# Patient Record
Sex: MALE | Race: WHITE | NOT HISPANIC OR LATINO | Employment: FULL TIME | URBAN - METROPOLITAN AREA
[De-identification: names, ages, dates, MRNs, and addresses within clinical notes are randomized per-mention and may not be internally consistent; named-entity substitution may affect disease eponyms.]

---

## 2020-12-30 DIAGNOSIS — F42.2 MIXED OBSESSIONAL THOUGHTS AND ACTS: Primary | ICD-10-CM

## 2020-12-30 RX ORDER — PAROXETINE HYDROCHLORIDE 25 MG/1
25 TABLET, FILM COATED, EXTENDED RELEASE ORAL 2 TIMES DAILY
COMMUNITY
End: 2020-12-30 | Stop reason: SDUPTHER

## 2020-12-30 RX ORDER — PAROXETINE HYDROCHLORIDE 25 MG/1
25 TABLET, FILM COATED, EXTENDED RELEASE ORAL 2 TIMES DAILY
Qty: 60 TABLET | Refills: 0 | Status: SHIPPED | OUTPATIENT
Start: 2020-12-30 | End: 2021-02-09 | Stop reason: SDUPTHER

## 2021-01-04 ENCOUNTER — TELEPHONE (OUTPATIENT)
Dept: BEHAVIORAL/MENTAL HEALTH CLINIC | Facility: CLINIC | Age: 41
End: 2021-01-04

## 2021-01-04 NOTE — TELEPHONE ENCOUNTER
Pharmacy called and wanted to know if this was an increase in dose  Ahmet Jacobs has been getting one tab not two  When pt was asked on the dose he was taking, he replied two tabs

## 2021-01-11 ENCOUNTER — TELEMEDICINE (OUTPATIENT)
Dept: PSYCHIATRY | Facility: CLINIC | Age: 41
End: 2021-01-11
Payer: COMMERCIAL

## 2021-01-11 DIAGNOSIS — Z79.899 ENCOUNTER FOR LONG-TERM (CURRENT) USE OF HIGH-RISK MEDICATION: ICD-10-CM

## 2021-01-11 DIAGNOSIS — F42.9 OBSESSIVE-COMPULSIVE DISORDER, UNSPECIFIED TYPE: Primary | ICD-10-CM

## 2021-01-11 PROCEDURE — 99214 OFFICE O/P EST MOD 30 MIN: CPT | Performed by: NURSE PRACTITIONER

## 2021-01-11 RX ORDER — LURASIDONE HYDROCHLORIDE 20 MG/1
20 TABLET, FILM COATED ORAL
Qty: 30 TABLET | Refills: 0 | Status: SHIPPED | OUTPATIENT
Start: 2021-01-11 | End: 2021-02-10 | Stop reason: DRUGHIGH

## 2021-01-11 NOTE — PSYCH
PROGRESS NOTE        [facility]      Name and Date of Birth:  Patty Schroeder 47 y o  1980    Date of Visit: 01/11/21    Pt was seen for medication management for 30 minutes via Teams with pts consent  Door to office was closed, provider was alone in office  SUBJECTIVE: Pt reports increased obsessive thoughts and compulsive behavior regarding orderliness and contamination  His own clothes must be free of germs although the same does not apply to his wife and children, which pt calls "illogical " When pt comes home from work although he is very tired he has to tidy up his kids' things  At work he is not stressed out about germs, only at home and only on himself and his clothing  He denies suicidal ideation, intent or plan at present, has no suicidal ideation, intent or plan at present  He denies any auditory hallucinations and visual hallucinations, denies any other delusional thinking, denies any delusional thinking  He denies any side effects from medications    HPI ROS Appetite Changes and Sleep: normal appetite, normal sleep    Review Of Systems:      Constitutional Negative   ENT Negative   Cardiovascular Negative   Respiratory Negative   Gastrointestinal Negative   Genitourinary Negative   Musculoskeletal Negative   Integumentary Negative   Neurological Negative   Endocrine Negative   Other Symptoms Negative and None       Laboratory Results: No results found for this or any previous visit  Substance Abuse History:    Social History     Substance and Sexual Activity   Drug Use Not on file       Family Psychiatric History:     No family history on file      The following portions of the patient's history were reviewed and updated as appropriate: past family history, past medical history, past social history, past surgical history and problem list     Social History     Socioeconomic History    Marital status: /Civil Union Spouse name: Not on file    Number of children: Not on file    Years of education: Not on file    Highest education level: Not on file   Occupational History    Not on file   Social Needs    Financial resource strain: Not on file    Food insecurity     Worry: Not on file     Inability: Not on file    Transportation needs     Medical: Not on file     Non-medical: Not on file   Tobacco Use    Smoking status: Not on file   Substance and Sexual Activity    Alcohol use: Not on file    Drug use: Not on file    Sexual activity: Not on file   Lifestyle    Physical activity     Days per week: Not on file     Minutes per session: Not on file    Stress: Not on file   Relationships    Social connections     Talks on phone: Not on file     Gets together: Not on file     Attends Christianity service: Not on file     Active member of club or organization: Not on file     Attends meetings of clubs or organizations: Not on file     Relationship status: Not on file    Intimate partner violence     Fear of current or ex partner: Not on file     Emotionally abused: Not on file     Physically abused: Not on file     Forced sexual activity: Not on file   Other Topics Concern    Not on file   Social History Narrative    Not on file     Social History     Social History Narrative    Not on file        Social History    None             OBJECTIVE:     Mental Status Evaluation:    Appearance age appropriate, casually dressed   Behavior pleasant, cooperative   Speech normal volume, normal pitch   Mood Unhappy, anxious   Affect Mood-congruent   Thought Processes normal   Associations intact associations   Thought Content normal   Perceptual Disturbances: none   Abnormal Thoughts  Risk Potential Suicidal ideation - None  Homicidal ideation - None  Potential for aggression - No   Orientation oriented to person, place, time/date and situation   Memory recent and remote memory grossly intact   Cosciousness alert and awake Attention Span attention span and concentration are age appropriate   Intellect Not formally assessed   Insight age appropriate    Judgement good    Muscle Strength and  Gait Not observed   Language no difficulty naming common objects   Fund of Knowledge displays adequate knowledge of current events   Pain none   Pain Scale 0       Assessment/Plan:       Diagnoses and all orders for this visit:    Obsessive-compulsive disorder, unspecified type          Treatment Recommendations/Precautions:    Start Latuda 20 mg, take with 40 mg tab with dinner (60 mg total daily dose)  Continue current medications: paroxetine ER 25 mg 2 tabs po qd: Latuda 40 mg tab 1 tab po with dinner  Ordered CMP, Lipid panel    Risks/Benefits      Risks, Benefits And Possible Side Effects Of Medications:    Risks, benefits, and possible side effects of medications explained to patient and patient verbalizes understanding and agreement for treatment      Controlled Medication Discussion:     Not applicable    Psychotherapy Provided:     Individual psychotherapy provided: No

## 2021-01-11 NOTE — BH TREATMENT PLAN
TREATMENT PLAN         [facility]    Name and Date of Birth:  Jane Davis 47 y o  1980    Date of Treatment Plan: January 11, 2021    Diagnosis/Diagnoses:    1  Obsessive-compulsive disorder, unspecified type        Strengths/Personal Resources for Self-Care: ability to communicate well, good understanding of illness, motivation for treatment  Area/Areas of need (in own words): obsessive-compulsive symptoms  1  Long Term Goal: decrease obsessive thoughts and compulsive behaviors  Target date - 7/11/2021  Person/Persons responsible for completion of goal: lauren Bermudez    2  Short Term Objective (s) - How will we reach this goal?:   A  Provider new recommended medication/dosage changes and/or continue medication(s): continue Paxil, increase Latuda  B  Take medications as prescribed, attend scheduled appts  C  N/A  Target date - 7/11/2021  Person/Persons Responsible for Completion of Goal: lauren Bermudez    Progress Towards Goals: continuing treatment    Treatment Modality: medication management every 12 weeks    Review due 120 - 180 days from date of this plan:  7/11/2021  Expected length of service: continuing treatment  My Physician/PA/NP and I have developed this plan together and I agree to work on the goals and objectives  I understand the treatment goals that were developed for my treatment

## 2021-02-01 ENCOUNTER — TELEMEDICINE (OUTPATIENT)
Dept: PSYCHIATRY | Facility: CLINIC | Age: 41
End: 2021-02-01
Payer: COMMERCIAL

## 2021-02-01 DIAGNOSIS — F42.9 OBSESSIVE-COMPULSIVE DISORDER, UNSPECIFIED TYPE: Primary | ICD-10-CM

## 2021-02-01 PROCEDURE — 99214 OFFICE O/P EST MOD 30 MIN: CPT | Performed by: NURSE PRACTITIONER

## 2021-02-01 NOTE — PSYCH
PROGRESS NOTE        [facility] [facility]      Name and Date of Birth:  Eusebia Romero 47 y o  1980    Date of Visit: 02/01/21    Pt was seen today for medication management for 30 minutes via Teams with pts consent  Door to office was closed, provider was alone in office  Time spent on Epic chart review of relevant information, note composition, and after-visit plan: 5  minutes  Total time for this visit: 35  minutes      SUBJECTIVE: definitely doing better, sees the improvement, can rationalize the thoughts, not all the way better but would like to remain at current dose of Latuda, and paroxetine  1/11/2021: Pt reports increased obsessive thoughts and compulsive behavior regarding orderliness and contamination  His own clothes must be free of germs although the same does not apply to his wife and children, which pt calls "illogical " When pt comes home from work although he is very tired he has to tidy up his kids' things  At work he is not stressed out about germs, only at home and only on himself and his clothing  Start Latuda 20 mg, take with 40 mg tab with dinner (60 mg total daily dose)  Continue current medications: paroxetine ER 25 mg 2 tabs po qd: Latuda 40 mg tab 1 tab po with dinner  Ordered CMP, Lipid panel         He denies suicidal ideation, intent or plan at present, has no suicidal ideation, intent or plan at present  He denies any auditory hallucinations and visual hallucinations, denies any other delusional thinking, denies any delusional thinking  He denies any side effects from medications      HPI ROS Appetite Changes and Sleep: normal appetite, normal sleep    Review Of Systems:      Constitutional Negative   ENT Negative   Cardiovascular Negative   Respiratory Negative   Gastrointestinal Negative   Genitourinary Negative   Musculoskeletal Negative   Integumentary Negative   Neurological Negative   Endocrine Negative   Other Symptoms Negative and None       Laboratory Results: No results found for this or any previous visit  Substance Abuse History:    Social History     Substance and Sexual Activity   Drug Use Not on file       Family Psychiatric History:     No family history on file      The following portions of the patient's history were reviewed and updated as appropriate: past family history, past medical history, past social history, past surgical history and problem list     Social History     Socioeconomic History    Marital status: /Civil Union     Spouse name: Not on file    Number of children: Not on file    Years of education: Not on file    Highest education level: Not on file   Occupational History    Not on file   Social Needs    Financial resource strain: Not on file    Food insecurity     Worry: Not on file     Inability: Not on file    Transportation needs     Medical: Not on file     Non-medical: Not on file   Tobacco Use    Smoking status: Not on file   Substance and Sexual Activity    Alcohol use: Not on file    Drug use: Not on file    Sexual activity: Not on file   Lifestyle    Physical activity     Days per week: Not on file     Minutes per session: Not on file    Stress: Not on file   Relationships    Social connections     Talks on phone: Not on file     Gets together: Not on file     Attends Mos[employer] service: Not on file     Active member of club or organization: Not on file     Attends meetings of clubs or organizations: Not on file     Relationship status: Not on file    Intimate partner violence     Fear of current or ex partner: Not on file     Emotionally abused: Not on file     Physically abused: Not on file     Forced sexual activity: Not on file   Other Topics Concern    Not on file   Social History Narrative    Not on file     Social History     Social History Narrative    Not on file        Social History    None             OBJECTIVE:     Mental Status Evaluation:    Appearance age appropriate, casually dressed   Behavior pleasant, cooperative   Speech normal volume, normal pitch   Mood euthymic   Affect Mood-congruent   Thought Processes logical   Associations intact associations   Thought Content normal   Perceptual Disturbances: none   Abnormal Thoughts  Risk Potential Suicidal ideation - None  Homicidal ideation - None  Potential for aggression - No   Orientation oriented to person, place, time/date and situation   Memory recent and remote memory grossly intact   Cosciousness alert and awake   Attention Span attention span and concentration are age appropriate   Intellect Not formally assessed   Insight age appropriate    Judgement good    Muscle Strength and  Gait Not observed   Language no difficulty naming common objects   Fund of Knowledge displays adequate knowledge of current events   Pain none   Pain Scale 0       Assessment/Plan:       Diagnoses and all orders for this visit:    Obsessive-compulsive disorder, unspecified type          Treatment Recommendations/Precautions:    Continue current medications: paroxetine ER 25 mg 2 tabs po qd: Latuda 40 mg tab 1 tab po and Latuda 20 mg tab 1 tab po with dinner (total daily dose of Latuda = 60 mg po)    Risks/Benefits      Risks, Benefits And Possible Side Effects Of Medications:    Risks, benefits, and possible side effects of medications explained to patient and patient verbalizes understanding and agreement for treatment      Controlled Medication Discussion:     Not applicable    Psychotherapy Provided:     Individual psychotherapy provided: No

## 2021-02-08 DIAGNOSIS — F42.2 MIXED OBSESSIONAL THOUGHTS AND ACTS: ICD-10-CM

## 2021-02-09 DIAGNOSIS — F42.2 MIXED OBSESSIONAL THOUGHTS AND ACTS: ICD-10-CM

## 2021-02-10 RX ORDER — PAROXETINE HYDROCHLORIDE 25 MG/1
25 TABLET, FILM COATED, EXTENDED RELEASE ORAL 2 TIMES DAILY
Qty: 60 TABLET | Refills: 0 | Status: SHIPPED | OUTPATIENT
Start: 2021-02-10 | End: 2021-03-08 | Stop reason: SDUPTHER

## 2021-02-10 RX ORDER — PAROXETINE HYDROCHLORIDE 25 MG/1
TABLET, FILM COATED, EXTENDED RELEASE ORAL
Qty: 60 TABLET | Refills: 0 | OUTPATIENT
Start: 2021-02-10

## 2021-03-08 DIAGNOSIS — F42.2 MIXED OBSESSIONAL THOUGHTS AND ACTS: ICD-10-CM

## 2021-03-08 RX ORDER — PAROXETINE HYDROCHLORIDE 25 MG/1
25 TABLET, FILM COATED, EXTENDED RELEASE ORAL 2 TIMES DAILY
Qty: 60 TABLET | Refills: 2 | Status: SHIPPED | OUTPATIENT
Start: 2021-03-08 | End: 2021-04-26 | Stop reason: SDUPTHER

## 2021-04-04 NOTE — PSYCH
PROGRESS NOTE        [facility]      Name and Date of Birth:  Jaclyn Pérez 47 y o  1980    Date of Visit: 04/04/21    Pt was seen today via [facility] for medication management and psychotherapy with pts consent; pt was unable to connect via Teams  Door to office was closed, provider was alone in office  Time spent on psychotherapy:  27 Minutes    Treatment modality: medication management; medication education      SUBJECTIVE: Had been taking Latuda 60 mg qd since dose increase from 40 mg qd on 1/11/21  Then, about three weeks after his last appointment on 2/1/2021, he started to get bad joint and muscle pain,, so he stopped taking Latuda completely (=0mg/day) but after a week felt an uneasy feeling like something bad was going to happen so he knew he needed something to treat it and restarted Latuda 60 mg/day  About three days after he restarted the 60 mg dose the pain returned, pelvic and joint pain and biceps pain, mostly joint and bone  But for the past week or so he has been taking 60 mg every other day because he needs it; he was feeling uneasy again, that his need to check and recheck and need for order was returning, so he would like to take 20 mg qd, which will give him a little comfort without muscle pain  When he took 20 mg qd he wasn't having the need to put things away or fear of contamination, could rationalize the thoughts and not act on them  He is seeing a rheumatologist who has ordered lab work (available lab reports reviewed today)  2/1/2021: definitely doing better, sees the improvement, can rationalize the thoughts, not all the way better but would like to remain at current dose of Latuda, and paroxetine  Continue paroxetine ER 25 mg 2 tabs po qd:  Latuda 40 mg tab 1 tab po and Latuda 20 mg tab 1 tab po with dinner (total daily dose of Latuda = 60 mg po)     1/11/2021: Pt reports increased obsessive thoughts and compulsive behavior regarding orderliness and contamination  His own clothes must be free of germs although the same does not apply to his wife and children, which pt calls "illogical " When pt comes home from work although he is very tired he has to tidy up his kids' things  At work he is not stressed out about germs, only at home and only on himself and his clothing   Start Latuda 20 mg, take with 40 mg tab with dinner (60 mg total daily dose)  Continue current medications: paroxetine ER 25 mg 2 tabs po qd: Latuda 40 mg tab 1 tab po with dinner  Ordered CMP, Lipid panel      He denies suicidal ideation, intent or plan at present, has no suicidal ideation, intent or plan at present  He denies any auditory hallucinations and visual hallucinations, denies any other delusional thinking, denies any delusional thinking  He denies any side effects from medications    HPI ROS Appetite Changes and Sleep: normal appetite, normal sleep    Review Of Systems:      Constitutional Negative   ENT Negative   Cardiovascular Negative   Respiratory Negative   Gastrointestinal Negative   Genitourinary Negative   Musculoskeletal Negative   Integumentary Negative   Neurological Negative   Endocrine Negative   Other Symptoms Negative and None       Laboratory Results: No results found for this or any previous visit  Substance Abuse History:    Social History     Substance and Sexual Activity   Drug Use Not on file       Family Psychiatric History:     No family history on file      The following portions of the patient's history were reviewed and updated as appropriate: past family history, past medical history, past social history, past surgical history and problem list     Social History     Socioeconomic History    Marital status: /Civil Union     Spouse name: Not on file    Number of children: Not on file    Years of education: Not on file    Highest education level: Not on file   Occupational History    Not on file Social Needs    Financial resource strain: Not on file    Food insecurity     Worry: Not on file     Inability: Not on file    Transportation needs     Medical: Not on file     Non-medical: Not on file   Tobacco Use    Smoking status: Not on file   Substance and Sexual Activity    Alcohol use: Not on file    Drug use: Not on file    Sexual activity: Not on file   Lifestyle    Physical activity     Days per week: Not on file     Minutes per session: Not on file    Stress: Not on file   Relationships    Social connections     Talks on phone: Not on file     Gets together: Not on file     Attends [employer] service: Not on file     Active member of club or organization: Not on file     Attends meetings of clubs or organizations: Not on file     Relationship status: Not on file    Intimate partner violence     Fear of current or ex partner: Not on file     Emotionally abused: Not on file     Physically abused: Not on file     Forced sexual activity: Not on file   Other Topics Concern    Not on file   Social History Narrative    Not on file     Social History     Social History Narrative    Not on file        Social History    None             OBJECTIVE:     Reviewed pt's labs, kidney function and electrolytes WNL       Mental Status Evaluation:    Appearance age appropriate, casually dressed   Behavior pleasant, cooperative   Speech normal volume, normal pitch   Mood Slightly unhappy   Affect Mood-congruent   Thought Processes Coherent, organized, goal-directed   Associations intact associations   Thought Content normal   Perceptual Disturbances: none   Abnormal Thoughts  Risk Potential Suicidal ideation - None  Homicidal ideation - None  Potential for aggression - No   Orientation oriented to person, place, time/date and situation   Memory recent and remote memory grossly intact   Cosciousness alert and awake   Attention Span attention span and concentration are age appropriate   Intellect Not formally assessed   Insight age appropriate    Judgement good    Muscle Strength and  Gait muscle strength and tone were normal   Language no difficulty naming common objects   Fund of Knowledge displays adequate knowledge of current events   Pain none   Pain Scale 0       Assessment/Plan:       Diagnoses and all orders for this visit:    Mixed obsessional thoughts and acts          Treatment Recommendations/Precautions:    Decrease Latuda from 60 mg tab one tab qod to Latuda 20 mg tab 1 tab po qd (take with food)  Continue current medications: paroxetine ER 25 mg 2 tabs po qd    Risks/Benefits      Risks, Benefits And Possible Side Effects Of Medications:    Risks, benefits, and possible side effects of medications explained to patient and patient verbalizes understanding and agreement for treatment      Controlled Medication Discussion:     Not applicable

## 2021-04-05 ENCOUNTER — TELEMEDICINE (OUTPATIENT)
Dept: PSYCHIATRY | Facility: CLINIC | Age: 41
End: 2021-04-05
Payer: COMMERCIAL

## 2021-04-05 DIAGNOSIS — T50.905A ADVERSE DRUG EFFECT, INITIAL ENCOUNTER: ICD-10-CM

## 2021-04-05 DIAGNOSIS — F42.2 MIXED OBSESSIONAL THOUGHTS AND ACTS: Primary | ICD-10-CM

## 2021-04-05 PROCEDURE — 99214 OFFICE O/P EST MOD 30 MIN: CPT | Performed by: NURSE PRACTITIONER

## 2021-04-05 PROCEDURE — 90833 PSYTX W PT W E/M 30 MIN: CPT | Performed by: NURSE PRACTITIONER

## 2021-04-26 ENCOUNTER — TELEMEDICINE (OUTPATIENT)
Dept: PSYCHIATRY | Facility: CLINIC | Age: 41
End: 2021-04-26
Payer: COMMERCIAL

## 2021-04-26 DIAGNOSIS — T50.905D ADVERSE DRUG EFFECT, SUBSEQUENT ENCOUNTER: ICD-10-CM

## 2021-04-26 DIAGNOSIS — F42.2 MIXED OBSESSIONAL THOUGHTS AND ACTS: Primary | ICD-10-CM

## 2021-04-26 PROCEDURE — 99214 OFFICE O/P EST MOD 30 MIN: CPT | Performed by: NURSE PRACTITIONER

## 2021-04-26 RX ORDER — PAROXETINE HYDROCHLORIDE 25 MG/1
25 TABLET, FILM COATED, EXTENDED RELEASE ORAL 2 TIMES DAILY
Qty: 60 TABLET | Refills: 2 | Status: SHIPPED | OUTPATIENT
Start: 2021-04-26 | End: 2021-05-24 | Stop reason: SDUPTHER

## 2021-04-26 NOTE — PSYCH
PROGRESS NOTE        [facility]      Name and Date of Birth:  Maria E Grubbs 47 y o  1980    Date of Visit: 04/26/21    Pt was spoken to today by phone for medication management and psychotherapy; pt was unable to connect via Teams  Door to office was closed; provider was alone in office, pt aware and consented to phone session  Time spent on psychotherapy:  none    Treatment modality: medication management; medication education; discussed ANSHU lab result (positive), Lyme result (negative)      SUBJECTIVE: still not feeling relieved with Latuda 20 mg and would like to go back to 40 mg qd, has been having uneasy feeling, unsettled  Still has muscle and joint aches and pains all day and worse at night, no decrease in pain, especially joint pain, with dose decrease to Latuda 20 mg     4/5/2021: Had been taking Latuda 60 mg qd since dose increase from 40 mg qd on 1/11/21  Then, about three weeks after his last appointment on 2/1/2021, he started to get bad joint and muscle pain,, so he stopped taking Latuda completely (=0mg/day) but after a week felt an uneasy feeling like something bad was going to happen so he knew he needed something to treat it and restarted Latuda 60 mg/day  About three days after he restarted the 60 mg dose the pain returned, pelvic and joint pain and biceps pain, mostly joint and bone  But for the past week or so he has been taking 60 mg every other day because he needs it; he was feeling uneasy again, that his need to check and recheck and need for order was returning, so he would like to take 20 mg qd, which will give him a little comfort without muscle pain  When he took 20 mg qd he wasn't having the need to put things away or fear of contamination, could rationalize the thoughts and not act on them  He is seeing a rheumatologist who has ordered lab work (available lab reports reviewed today)   Decrease Latuda from 60 mg tab one tab qod to Latuda 20 mg tab 1 tab po qd (take with food)  Continue paroxetine ER 25 mg 2 tabs po qd     2/1/2021: definitely doing better, sees the improvement, can rationalize the thoughts, not all the way better but would like to remain at current dose of Latuda, and paroxetine  Continue paroxetine ER 25 mg 2 tabs po qd: Latuda 40 mg tab 1 tab po and Latuda 20 mg tab 1 tab po with dinner (total daily dose of Latuda = 60 mg po)     1/11/2021: Pt reports increased obsessive thoughts and compulsive behavior regarding orderliness and contamination  His own clothes must be free of germs although the same does not apply to his wife and children, which pt calls "illogical " When pt comes home from work although he is very tired he has to tidy up his kids' things  At work he is not stressed out about germs, only at home and only on himself and his clothing   Start Latuda 20 mg, take with 40 mg tab with dinner (60 mg total daily dose)  Continue current medications: paroxetine ER 25 mg 2 tabs po qd: Latuda 40 mg tab 1 tab po with dinner  Ordered CMP, Lipid panel      He denies suicidal ideation, intent or plan at present, has no suicidal ideation, intent or plan at present  He denies any auditory hallucinations and visual hallucinations, denies any other delusional thinking, denies any delusional thinking  He denies any side effects from medications    HPI ROS Appetite Changes and Sleep: normal appetite, normal sleep    Review Of Systems:      Constitutional Negative   ENT Negative   Cardiovascular Negative   Respiratory Negative   Gastrointestinal Negative   Genitourinary Negative   Musculoskeletal Negative   Integumentary Negative   Neurological Negative   Endocrine Negative   Other Symptoms Negative and None       Laboratory Results: No results found for this or any previous visit      Substance Abuse History:    Social History     Substance and Sexual Activity   Drug Use Not on file       Family Psychiatric History:     No family history on file      The following portions of the patient's history were reviewed and updated as appropriate: past family history, past medical history, past social history, past surgical history and problem list     Social History     Socioeconomic History    Marital status: /Civil Union     Spouse name: Not on file    Number of children: Not on file    Years of education: Not on file    Highest education level: Not on file   Occupational History    Not on file   Social Needs    Financial resource strain: Not on file    Food insecurity     Worry: Not on file     Inability: Not on file    Transportation needs     Medical: Not on file     Non-medical: Not on file   Tobacco Use    Smoking status: Not on file   Substance and Sexual Activity    Alcohol use: Not on file    Drug use: Not on file    Sexual activity: Not on file   Lifestyle    Physical activity     Days per week: Not on file     Minutes per session: Not on file    Stress: Not on file   Relationships    Social connections     Talks on phone: Not on file     Gets together: Not on file     Attends Amish service: Not on file     Active member of club or organization: Not on file     Attends meetings of clubs or organizations: Not on file     Relationship status: Not on file    Intimate partner violence     Fear of current or ex partner: Not on file     Emotionally abused: Not on file     Physically abused: Not on file     Forced sexual activity: Not on file   Other Topics Concern    Not on file   Social History Narrative    Not on file     Social History     Social History Narrative    Not on file        Social History    None             OBJECTIVE:     Mental Status Evaluation:    Appearance Phone appointment, not assessed   Behavior pleasant, cooperative   Speech normal volume, normal pitch   Mood Mildly anxious   Affect Phone appointment, not assessed   Thought Processes Coherent   Associations intact associations   Thought Content normal   Perceptual Disturbances: none   Abnormal Thoughts  Risk Potential Suicidal ideation - None  Homicidal ideation - None  Potential for aggression - No   Orientation oriented to person, place, time/date and situation   Memory recent and remote memory grossly intact   Cosciousness alert and awake   Attention Span attention span and concentration are age appropriate   Intellect Not formally assessed   Insight age appropriate    Judgement good    Muscle Strength and  Gait Phone appointment, not assessed   Language no difficulty naming common objects   Fund of Knowledge displays adequate knowledge of current events   Pain none   Pain Scale 0       Assessment/Plan:       Diagnoses and all orders for this visit:    Mixed obsessional thoughts and acts  -     PARoxetine (PAXIL-CR) 25 mg 24 hr tablet; Take 1 tablet (25 mg total) by mouth 2 (two) times a day  -     lurasidone (Latuda) 40 mg tablet; Take 1 tablet (40 mg total) by mouth daily with breakfast    Adverse drug effect, subsequent encounter          Treatment Recommendations/Precautions:    Increase Latuda  20 mg tab 1 tab po qd to 40 mg tab 1 tab po qd (take with food)  Continue current medications: paroxetine ER 25 mg 2 tabs po qd      Risks/Benefits      Risks, Benefits And Possible Side Effects Of Medications:    Risks, benefits, and possible side effects of medications explained to patient and patient verbalizes understanding and agreement for treatment      Controlled Medication Discussion:     Not applicable

## 2021-05-24 ENCOUNTER — TELEMEDICINE (OUTPATIENT)
Dept: PSYCHIATRY | Facility: CLINIC | Age: 41
End: 2021-05-24
Payer: COMMERCIAL

## 2021-05-24 DIAGNOSIS — T50.905D ADVERSE DRUG EFFECT, SUBSEQUENT ENCOUNTER: ICD-10-CM

## 2021-05-24 DIAGNOSIS — F42.2 MIXED OBSESSIONAL THOUGHTS AND ACTS: Primary | ICD-10-CM

## 2021-05-24 PROCEDURE — 99214 OFFICE O/P EST MOD 30 MIN: CPT | Performed by: NURSE PRACTITIONER

## 2021-05-24 PROCEDURE — 90833 PSYTX W PT W E/M 30 MIN: CPT | Performed by: NURSE PRACTITIONER

## 2021-05-24 RX ORDER — LURASIDONE HYDROCHLORIDE 60 MG/1
60 TABLET, FILM COATED ORAL
Qty: 30 TABLET | Refills: 2 | Status: SHIPPED | OUTPATIENT
Start: 2021-05-24 | End: 2021-09-28 | Stop reason: SDUPTHER

## 2021-05-24 RX ORDER — PAROXETINE HYDROCHLORIDE 25 MG/1
25 TABLET, FILM COATED, EXTENDED RELEASE ORAL 2 TIMES DAILY
Qty: 60 TABLET | Refills: 2 | Status: SHIPPED | OUTPATIENT
Start: 2021-05-24 | End: 2021-10-25 | Stop reason: SDUPTHER

## 2021-05-24 NOTE — BH TREATMENT PLAN
TREATMENT PLAN         [facility]    Name and Date of Birth:  Tiffany Mays 47 y o  1980    Date of Treatment Plan: May 24, 2021    Diagnosis/Diagnoses:    1  Mixed obsessional thoughts and acts    2  Adverse drug effect, subsequent encounter          Strengths/Personal Resources for Self-Care: ability to communicate well, good understanding of illness, motivation for treatment      Area/Areas of need (in own words): obsessive-compulsive symptoms  1  Long Term Goal: continue decrease obsessive thoughts and compulsive behaviors  Target date - 11/24/2021  Person/Persons responsible for completion of goal: Sarkar Rim, provider     2  Short Term Objective (s) - How will we reach this goal?:   A  Provider new recommended medication/dosage changes and/or continue medication(s): Paxil,  Latuda  B  Take medications as prescribed, attend scheduled appts  C  N/A  Target date - 11/24/2021  Person/Persons Responsible for Completion of Goal: Sarkar Rim, provider     Progress Towards Goals: continuing treatment     Treatment Modality: medication management every 12 weeks     Review due 120 - 180 days from date of this plan:  11/14/2021  Expected length of service: continuing treatment  My Physician/PA/NP and I have developed this plan together and I agree to work on the goals and objectives  I understand the treatment goals that were developed for my treatment

## 2021-05-24 NOTE — PSYCH
PROGRESS NOTE        [facility]      Name and Date of Birth:  Elisha Messer 47 y o  1980    Date of Visit: 05/24/21    Pt was seen today via [address] [place] [address] for medication management and psychotherapy with pts consent  Door to office was closed, provider was alone in office  Time spent on psychotherapy: 19  minutes    Treatment modality: medication management; medication education, reinforce adaptive behavior      SUBJECTIVE: tried Latuda 40 mg but it didn't feel as good as it used to, so he increased it to 60 mg qd and is feeling like himself again emotionally; he had a bottle of Latuda 60 mg on hand that was left over from before the dose was decreased  With or without Latuda the muscle pain is the same, but without it he feels the return of  uneasiness and need to check and recheck  He has an appointment with a rheumatologist this Friday for assessment of his muscle aches  4/26/2021: still not feeling relieved with Latuda 20 mg and would like to go back to 40 mg qd, has been having uneasy feeling, unsettled  Still has muscle and joint aches and pains all day and worse at night, no decrease in pain, especially joint pain, with dose decrease to Latuda 20 mg      4/5/2021: Had been taking Latuda 60 mg qd since dose increase from 40 mg qd on 1/11/21  [place], about three weeks after his last appointment on 2/1/2021, he started to get bad joint and muscle pain,, so he stopped taking Latuda completely (=0mg/day) but after a week felt an uneasy feeling like something bad was going to happen so he knew he needed something to treat it and restarted Latuda 60 mg/day  About three days after he restarted the 60 mg dose the pain returned, pelvic and joint pain and biceps pain, mostly joint and bone   But for the past week or so he has been taking 60 mg every other day because he needs it; he was feeling uneasy again, that his need to check and recheck and need for order was returning, so he would like to take 20 mg qd, which will give him a little comfort without muscle pain  When he took 20 mg qd he wasn't having the need to put things away or fear of contamination, could rationalize the thoughts and not act on them   He is seeing a rheumatologist who has ordered lab work (available lab reports reviewed today)  Decrease Latuda from 60 mg tab one tab qod to Latuda 20 mg tab 1 tab po qd (take with food)  Continue paroxetine ER 25 mg 2 tabs po qd     2/1/2021: definitely doing better, sees the improvement, can rationalize the thoughts, not all the way better but would like to remain at current dose of Latuda, and paroxetine  Continue paroxetine ER 25 mg 2 tabs po qd: Latuda 40 mg tab 1 tab po and Latuda 20 mg tab 1 tab po with dinner (total daily dose of Latuda = 60 mg po)     1/11/2021: Pt reports increased obsessive thoughts and compulsive behavior regarding orderliness and contamination  His own clothes must be free of germs although the same does not apply to his wife and children, which pt calls "illogical " When pt comes home from work although he is very tired he has to tidy up his kids' things  At work he is not stressed out about germs, only at home and only on himself and his clothing   Start Latuda 20 mg, take with 40 mg tab with dinner (60 mg total daily dose)  Continue current medications: paroxetine ER 25 mg 2 tabs po qd: Latuda 40 mg tab 1 tab po with dinner  Ordered CMP, Lipid panel      He denies suicidal ideation, intent or plan at present, has no suicidal ideation, intent or plan at present  He denies any auditory hallucinations and visual hallucinations, denies any other delusional thinking, denies any delusional thinking  He denies any side effects from medications      HPI ROS Appetite Changes and Sleep: normal appetite, normal sleep    Review Of Systems:      Constitutional Negative   ENT Negative   Cardiovascular Negative   Respiratory Negative Gastrointestinal Negative   Genitourinary Negative   Musculoskeletal Negative   Integumentary Negative   Neurological Negative   Endocrine Negative   Other Symptoms Negative and None       Laboratory Results: No results found for this or any previous visit  Substance Abuse History:    Social History     Substance and Sexual Activity   Drug Use Not on file       Family Psychiatric History:     No family history on file      The following portions of the patient's history were reviewed and updated as appropriate: past family history, past medical history, past social history, past surgical history and problem list     Social History     Socioeconomic History    Marital status: /Civil Union     Spouse name: Not on file    Number of children: Not on file    Years of education: Not on file    Highest education level: Not on file   Occupational History    Not on file   Social Needs    Financial resource strain: Not on file    Food insecurity     Worry: Not on file     Inability: Not on file    Transportation needs     Medical: Not on file     Non-medical: Not on file   Tobacco Use    Smoking status: Not on file   Substance and Sexual Activity    Alcohol use: Not on file    Drug use: Not on file    Sexual activity: Not on file   Lifestyle    Physical activity     Days per week: Not on file     Minutes per session: Not on file    Stress: Not on file   Relationships    Social connections     Talks on phone: Not on file     Gets together: Not on file     Attends Taoism service: Not on file     Active member of club or organization: Not on file     Attends meetings of clubs or organizations: Not on file     Relationship status: Not on file    Intimate partner violence     Fear of current or ex partner: Not on file     Emotionally abused: Not on file     Physically abused: Not on file     Forced sexual activity: Not on file   Other Topics Concern    Not on file   Social History Narrative    Not on file     Social History     Social History Narrative    Not on file        Social History    None             OBJECTIVE:     Mental Status Evaluation:    Appearance age appropriate, casually dressed   Behavior pleasant, cooperative   Speech normal volume, normal pitch   Mood ok   Affect Mood-congruent   Thought Processes Coherent, organized, goal-directed   Associations intact associations   Thought Content normal   Perceptual Disturbances: none   Abnormal Thoughts  Risk Potential Suicidal ideation - None  Homicidal ideation - None  Potential for aggression - No   Orientation oriented to person, place, time/date and situation   Memory recent and remote memory grossly intact   Cosciousness alert and awake   Attention Span attention span and concentration are age appropriate   Intellect   Not formally assessed   Insight age appropriate    Judgement good    Muscle Strength and  Gait muscle strength and tone were normal   Language no difficulty naming common objects   Fund of Knowledge displays adequate knowledge of current events   Pain none   Pain Scale 0     Patient's chart was reviewed for relevant lab reports and recent encounters with other providers  Medications, treatment progress and treatment plan were reviewed with patient  Treatment plan was updated with patient who agreed with updated plan  Assessment/Plan:       Diagnoses and all orders for this visit:    Mixed obsessional thoughts and acts  -     Lurasidone HCl (Latuda) 60 MG TABS; Take 1 tablet (60 mg total) by mouth daily at bedtime - with food  -     PARoxetine (PAXIL-CR) 25 mg 24 hr tablet;  Take 1 tablet (25 mg total) by mouth 2 (two) times a day    Adverse drug effect, subsequent encounter          Treatment Recommendations/Precautions:    Continue current medications:    Latuda 60 mg tablet - take 1 tablet by mouth daily before bedtime, take with food   paroxetine CR 25 mg tablet - take 1 tablet by mouth two times daily    Risks/Benefits Risks, Benefits And Possible Side Effects Of Medications:    Risks, benefits, and possible side effects of medications explained to patient and patient verbalizes understanding and agreement for treatment      Controlled Medication Discussion:     Not applicable

## 2021-05-24 NOTE — PATIENT INSTRUCTIONS
Continue current medications:    Latuda 60 mg tablet - take 1 tablet by mouth daily before bedtime, take with food   paroxetine CR 25 mg tablet - take 1 tablet by mouth two times daily

## 2021-09-28 DIAGNOSIS — F42.2 MIXED OBSESSIONAL THOUGHTS AND ACTS: ICD-10-CM

## 2021-09-28 RX ORDER — LURASIDONE HYDROCHLORIDE 60 MG/1
60 TABLET, FILM COATED ORAL
Qty: 30 TABLET | Refills: 0 | Status: SHIPPED | OUTPATIENT
Start: 2021-09-28 | End: 2021-10-25 | Stop reason: SDUPTHER

## 2021-10-25 ENCOUNTER — TELEMEDICINE (OUTPATIENT)
Dept: PSYCHIATRY | Facility: CLINIC | Age: 41
End: 2021-10-25
Payer: COMMERCIAL

## 2021-10-25 DIAGNOSIS — F42.2 MIXED OBSESSIONAL THOUGHTS AND ACTS: Primary | ICD-10-CM

## 2021-10-25 DIAGNOSIS — T50.905D ADVERSE DRUG EFFECT, SUBSEQUENT ENCOUNTER: ICD-10-CM

## 2021-10-25 PROBLEM — K22.10 EROSIVE ESOPHAGITIS: Status: ACTIVE | Noted: 2021-10-25

## 2021-10-25 PROBLEM — K76.0 FATTY LIVER DISEASE, NONALCOHOLIC: Status: ACTIVE | Noted: 2021-10-25

## 2021-10-25 PROCEDURE — 99214 OFFICE O/P EST MOD 30 MIN: CPT | Performed by: NURSE PRACTITIONER

## 2021-10-25 RX ORDER — PAROXETINE HYDROCHLORIDE 25 MG/1
25 TABLET, FILM COATED, EXTENDED RELEASE ORAL 2 TIMES DAILY
Qty: 60 TABLET | Refills: 1 | Status: SHIPPED | OUTPATIENT
Start: 2021-10-25 | End: 2022-01-10 | Stop reason: SDUPTHER

## 2021-10-25 RX ORDER — LURASIDONE HYDROCHLORIDE 60 MG/1
60 TABLET, FILM COATED ORAL
Qty: 30 TABLET | Refills: 2 | Status: SHIPPED | OUTPATIENT
Start: 2021-10-25 | End: 2022-01-10 | Stop reason: SDUPTHER

## 2021-10-25 RX ORDER — ALBUTEROL SULFATE 90 UG/1
2 AEROSOL, METERED RESPIRATORY (INHALATION) EVERY 4 HOURS PRN
COMMUNITY
Start: 2010-09-29

## 2022-01-10 ENCOUNTER — TELEMEDICINE (OUTPATIENT)
Dept: PSYCHIATRY | Facility: CLINIC | Age: 42
End: 2022-01-10
Payer: COMMERCIAL

## 2022-01-10 DIAGNOSIS — F42.2 MIXED OBSESSIONAL THOUGHTS AND ACTS: Primary | ICD-10-CM

## 2022-01-10 PROCEDURE — 99213 OFFICE O/P EST LOW 20 MIN: CPT | Performed by: NURSE PRACTITIONER

## 2022-01-10 RX ORDER — PAROXETINE HYDROCHLORIDE 25 MG/1
25 TABLET, FILM COATED, EXTENDED RELEASE ORAL 2 TIMES DAILY
Qty: 60 TABLET | Refills: 2 | Status: SHIPPED | OUTPATIENT
Start: 2022-01-10 | End: 2022-04-18 | Stop reason: SDUPTHER

## 2022-01-10 RX ORDER — LURASIDONE HYDROCHLORIDE 60 MG/1
60 TABLET, FILM COATED ORAL
Qty: 30 TABLET | Refills: 2 | Status: SHIPPED | OUTPATIENT
Start: 2022-01-10 | End: 2022-04-18 | Stop reason: SDUPTHER

## 2022-01-10 NOTE — PSYCH
This note was not shared with the patient due to privacy exception: note includes other individuals    Kyleigh      Name and Date of Birth:  Adolfo Prasad 46 y o  1980    Date of Visit: 01/10/22      Virtual Regular Visit    After connecting through [employer], this patient was identified by name and date of birth, was informed that this is a telemedicine visit, and that it is being conducted through [facility], which this patient was informed is a secure HIPPA-compliant platform; this patient agreed to proceed  During this visit, I was alone in my office and my office door was closed  This patient acknowledged consent and understanding of privacy and security of this video platform  This patient gives consent to continue and knows that He can discontinue the visit at any time  Time spent on psychotherapy:  5 minutes    Treatment modality: medication management; medication education, encouraged adaptive behavior e g  exercise, eat more balanced meals in the daytime      SUBJECTIVE: Denies obsessive re-checking  Taking psychiatric medications as prescribed, reports stable, positive response, denies side effects  Denies depression, anxiety  Doesn't eat that much at work, but eats a big dinner  Not exercising  No aching muscles  He had a good holiday season, he and Vargas Maier and the kids stayed home  10/25/2021: says his medications are working, better than at his last appointment  Taking Latuda 60 mg and paroxetine CR 25 mg as prescribed, not having body-wide aches anymore, now focused on his right hip and knee and not all the time  Dx with erosive esophagitis - didn't have heartburn but instead felt like a hairball in the back of his throat, also fatty liver and that is related to his weight  He has been on severe weight-loss programs, 500-600 dain/day, and loses weight but when he is off them he gains it back   His weight problem started 10 years ago, he went to a gym but not since 9/2019, he wants to but just hasn't gotten into it  Everything else is great  Work is always good, he works a lot of overtime lately, 24 hour shifts every week that he enjoys  Weighs around 265 now  Possible adverse reaction to Calista Shi has been ruled out  He denies suicidal ideation, intent or plan at present, has no suicidal ideation, intent or plan at present  He denies any auditory hallucinations and visual hallucinations, denies any other delusional thinking, denies any delusional thinking  He denies any side effects from medications      HPI ROS Appetite Changes and Sleep: appetite - too much, normal sleep    Review Of Systems:      Constitutional Negative   ENT Negative   Cardiovascular Negative   Respiratory Negative   Gastrointestinal Negative   Genitourinary Negative   Musculoskeletal Negative   Integumentary Negative   Neurological Negative   Endocrine Negative   Other Symptoms Negative and None       Laboratory Results: No results found for this or any previous visit  Substance Abuse History:    Social History     Substance and Sexual Activity   Drug Use Not on file       Family Psychiatric History:     No family history on file      The following portions of the patient's history were reviewed and updated as appropriate: past family history, past medical history, past social history, past surgical history and problem list     Social History     Socioeconomic History    Marital status: /Civil Union     Spouse name: Not on file    Number of children: Not on file    Years of education: Not on file    Highest education level: Not on file   Occupational History    Not on file   Tobacco Use    Smoking status: Not on file    Smokeless tobacco: Not on file   Substance and Sexual Activity    Alcohol use: Not on file    Drug use: Not on file    Sexual activity: Not on file   Other Topics Concern    Not on file   Social History Narrative    Not on file     Social Determinants of Health     Financial Resource Strain: Not on file   Food Insecurity: Not on file   Transportation Needs: Not on file   Physical Activity: Not on file   Stress: Not on file   Social Connections: Not on file   Intimate Partner Violence: Not on file   Housing Stability: Not on file     Social History     Social History Narrative    Not on file        Social History    None             OBJECTIVE:     Mental Status Evaluation:    Appearance age appropriate, casually dressed   Behavior pleasant, cooperative   Speech normal volume, normal pitch   Mood Fine - good   Affect Mood-congruent, full   Thought Processes Coherent, organized, goal-directed   Associations intact associations   Thought Content normal   Perceptual Disturbances: none   Abnormal Thoughts  Risk Potential Suicidal ideation - None  Homicidal ideation - None  Potential for aggression - No   Orientation oriented to person, place, date and situation   Memory recent and remote memory grossly intact   Consciousness alert and awake   Attention Span attention span and concentration are age appropriate   Intellect Not formally assessed   Insight intact   Judgement intact    Muscle Strength and  Gait muscle strength and tone were normal, gait normal   Language no difficulty naming common objects   Fund of Knowledge displays adequate knowledge of current events             The patient's chart was reviewed for relevant lab reports and recent encounters with other providers  Medications, treatment progress and treatment plan were reviewed with the patient  Assessment/Plan:       Diagnoses and all orders for this visit:    Mixed obsessional thoughts and acts  -     PARoxetine (PAXIL-CR) 25 mg 24 hr tablet;  Take 1 tablet (25 mg total) by mouth 2 (two) times a day  -     Lurasidone HCl (Latuda) 60 MG TABS; Take 1 tablet (60 mg total) by mouth daily at bedtime - with food          Treatment Recommendations/Precautions:    Continue current medications:               Latuda 60 mg tablet - take 1 tablet by mouth daily before bedtime, take with food              paroxetine CR 25 mg tablet - take 1 tablet by mouth two times daily     Risks/Benefits       Risks, Benefits And Possible Side Effects Of Medications:     Risks, benefits, and possible side effects of medications explained to patient and patient verbalizes understanding and agreement for treatment      Controlled Medication Discussion:      Not applicable

## 2022-04-18 ENCOUNTER — TELEMEDICINE (OUTPATIENT)
Dept: PSYCHIATRY | Facility: CLINIC | Age: 42
End: 2022-04-18
Payer: COMMERCIAL

## 2022-04-18 DIAGNOSIS — F42.2 MIXED OBSESSIONAL THOUGHTS AND ACTS: Primary | ICD-10-CM

## 2022-04-18 PROCEDURE — 99214 OFFICE O/P EST MOD 30 MIN: CPT | Performed by: NURSE PRACTITIONER

## 2022-04-18 RX ORDER — LURASIDONE HYDROCHLORIDE 60 MG/1
60 TABLET, FILM COATED ORAL
Qty: 30 TABLET | Refills: 2 | Status: SHIPPED | OUTPATIENT
Start: 2022-04-18 | End: 2022-06-15 | Stop reason: DRUGHIGH

## 2022-04-18 RX ORDER — SILDENAFIL 100 MG/1
100 TABLET, FILM COATED ORAL
COMMUNITY
Start: 2022-03-14 | End: 2022-06-12

## 2022-04-18 RX ORDER — PAROXETINE HYDROCHLORIDE 25 MG/1
25 TABLET, FILM COATED, EXTENDED RELEASE ORAL 2 TIMES DAILY
Qty: 60 TABLET | Refills: 1 | Status: SHIPPED | OUTPATIENT
Start: 2022-04-18 | End: 2022-05-24 | Stop reason: DRUGHIGH

## 2022-04-18 NOTE — PSYCH
This note was not shared with the patient due to privacy exception: note includes other individuals    [facility] [place]      Name and Date of Birth:  Iggy Das 46 y o  1980    Date of Visit: 04/18/22      Iggy Das was seen today for medication management and brief psychotherapy for OCD (checking), and possible side effect of paroxetine        Virtual Regular Visit    After connecting through [employer], this patient was identified by name and date of birth, was informed that this is a telemedicine visit, and that it is being conducted through [facility], which this patient was informed is a secure HIPPA-compliant platform; this patient agreed to proceed  During this visit, I was alone in my office and my office door was closed  This patient acknowledged consent and understanding of privacy and security of this video platform  This patient gives consent to continue and knows that He can discontinue the visit at any time  Pt verifies that He is physically located in [place], the [facility] in which I am licensed as an APN with prescriptive authority  Time spent on psychotherapy: 10 minutes    Treatment modality:   Medication management   Medication education  Supportive psychotherapy  Encouraged and reinforced adaptive behavior          SUBJECTIVE: Taking medications as prescribed  Denies checking and rechecking, doing well, not depressed  However, has been having trouble with decreased libido and inability to sustain erection, urologist said it may be caused by paroxetine; also ordered labs  FSH/LH, testosterone, and others (in chart)  Pt knows his symptoms, and would like to try to decrease paroxetine by 50%, and only take 1 tablet daily instead of 2, but ifhe  feels return of his symptoms of obsessive checking and re-checking, he will increase back to two tablets daily (takes 1 BID now)     He and Raz Jernigan and the kids stayed home for vacation rather than going to [place], and really enjoyed themselves  1/10/2022: Denies obsessive re-checking  Taking psychiatric medications as prescribed, reports stable, positive response, denies side effects  Denies depression, anxiety  Doesn't eat that much at work, but eats a big dinner  Not exercising  No aching muscles  He had a good holiday season, he and [place] and the kids stayed home           He denies suicidal ideation, intent or plan at present, has no suicidal ideation, intent or plan at present  He denies any auditory hallucinations and visual hallucinations, denies any other delusional thinking, denies any delusional thinking  He denies any side effects from medications other than possible negative effect on libido      HPI ROS Appetite Changes and Sleep:   Appetite - normal    Sleep - normal    Review Of Systems:      Constitutional Negative   ENT Negative   Cardiovascular Negative   Respiratory Negative   Gastrointestinal Negative   Genitourinary Negative   Musculoskeletal Negative   Integumentary Negative   Neurological Negative   Endocrine Negative   Other Symptoms Negative and None       Laboratory Results: No results found for this or any previous visit  Substance Abuse History:    Social History     Substance and Sexual Activity   Drug Use Not on file       Family Psychiatric History:     No family history on file      The following portions of the patient's history were reviewed and updated as appropriate: past family history, past medical history, past social history, past surgical history and problem list     Social History     Socioeconomic History    Marital status: /Civil Union     Spouse name: Not on file    Number of children: Not on file    Years of education: Not on file    Highest education level: Not on file   Occupational History    Not on file   Tobacco Use    Smoking status: Not on file    Smokeless tobacco: Not on file   Substance and Sexual Activity    Alcohol use: Not on file    Drug use: Not on file    Sexual activity: Not on file   Other Topics Concern    Not on file   Social History Narrative    Not on file     Social Determinants of Health     Financial Resource Strain: Not on file   Food Insecurity: Not on file   Transportation Needs: Not on file   Physical Activity: Not on file   Stress: Not on file   Social Connections: Not on file   Intimate Partner Violence: Not on file   Housing Stability: Not on file     Social History     Social History Narrative    Not on file        Social History    None             OBJECTIVE:     Mental Status Evaluation:    Appearance age appropriate, casually dressed   Behavior pleasant, cooperative   Speech normal rate, rhythm, volume   Mood "good"   Affect Mood-congruent, full   Thought Processes Coherent, organized, goal-directed   Associations intact associations   Thought Content normal   Perceptual Disturbances: none   Abnormal Thoughts  Risk Potential Suicidal ideation - None  Homicidal ideation - None  Potential for aggression - No   Orientation oriented to person, place, date and situation   Memory recent and remote memory grossly intact   Consciousness alert and awake   Attention Span attention span and concentration are age appropriate   Intellect Not formally assessed   Insight intact   Judgement intact    Muscle Strength and  Gait muscle strength and tone were normal, gait not observed   Language no difficulty naming common objects   Fund of Knowledge displays adequate knowledge of current events               The patient's chart was reviewed for relevant lab reports and recent encounters with other providers  Medications, treatment progress, and current treatment plan that was enacted on 10/25/202 and due for review/update on 4/25/2022 were reviewed with the patient  The treatment plan was updated today with the patient who verbally agreed with the updated plan     Today's treatment plan is due for review/update NLT 10/25/2022  Pt and writer were unable to sign plan because this was a virtual appointment  Treatment plan goals discussed in this encounter:  continue to decrease obsessive thoughts and compulsive behaviors  Assessment/Plan:       Diagnoses and all orders for this visit:    Mixed obsessional thoughts and acts  -     Lurasidone HCl (Latuda) 60 MG TABS; Take 1 tablet (60 mg total) by mouth daily at bedtime - with food  -     PARoxetine (PAXIL-CR) 25 mg 24 hr tablet; Take 1 tablet (25 mg total) by mouth 2 (two) times a day    Other orders  -     sildenafil (VIAGRA) 100 mg tablet;  Take 100 mg by mouth          Treatment Recommendations/Precautions:    Continue current medications:               Latuda 60 mg tablet - take 1 tablet by mouth daily before bedtime, take with food              paroxetine CR 25 mg tablet - take 1 tablet by mouth two times daily     Risks/Benefits       Risks, Benefits And Possible Side Effects Of Medications:     Risks, benefits, and possible side effects of medications explained to patient and patient verbalizes understanding and agreement for treatment      Controlled Medication Discussion:      Not applicable

## 2022-04-18 NOTE — BH TREATMENT PLAN
TREATMENT PLAN         [facility] [place]    Name and Date of Birth:  Kinjal Guzmán 46 y o  1980    Date of Treatment Plan: April 18, 2022    Diagnosis/Diagnoses:    1  Mixed obsessional thoughts and acts            Strengths/Personal Resources for Self-Care: ability to communicate well, good understanding of illness, motivation for treatment      Area/Areas of need (in own words): obsessive-compulsive symptoms  1          Long Term Goal: continue to decrease obsessive thoughts and compulsive behaviors  Target date - 10/18/2022  Person/Persons responsible for completion of goal: lauren Carrera     2          Short Term Objective (s) - How will we reach this goal?:   A  Provider new recommended medication/dosage changes and/or continue medication(s): Paxil,  Latuda  B attempt to decrease paroxetine CR; increase if not possible without return of symptoms  B  Take medications as prescribed, attend scheduled appts  Target date - 10/18/2022  Person/Persons Responsible for Completion of Goal: lauren Carrera     Progress Towards Goals: stable     Treatment Modality: medication management every 12 weeks     Review due 120 - 180 days from date of this plan: 10/18/2022  Expected length of service: continuing treatment  My Physician/PA/NP and I have developed this plan together and I agree to work on the goals and objectives  I understand the treatment goals that were developed for my treatment

## 2022-04-18 NOTE — PATIENT INSTRUCTIONS
Attempt to decrease paroxetine CR 25 mg BID to qd; however, if symptoms return, increase dose to BID    Continue current medication/dose: Latuda 60 mg tablet - take 1 tablet with food every day at bedtime

## 2022-05-23 ENCOUNTER — TELEPHONE (OUTPATIENT)
Dept: PSYCHIATRY | Facility: CLINIC | Age: 42
End: 2022-05-23

## 2022-05-23 NOTE — TELEPHONE ENCOUNTER
Patient called to see if Marcel Zuniga would be able to lower his dosage of Paxil-CR to 12 5mg  He would like a new script sent into the pharmacy  He said he has been doing fine since the last apt    His next apt is 7/25

## 2022-05-24 DIAGNOSIS — F42.2 MIXED OBSESSIONAL THOUGHTS AND ACTS: Primary | ICD-10-CM

## 2022-05-24 RX ORDER — PAROXETINE HYDROCHLORIDE 12.5 MG/1
12.5 TABLET, FILM COATED, EXTENDED RELEASE ORAL EVERY MORNING
Qty: 90 TABLET | Refills: 0 | Status: SHIPPED | OUTPATIENT
Start: 2022-05-24 | End: 2022-05-25 | Stop reason: SDUPTHER

## 2022-05-25 DIAGNOSIS — F42.2 MIXED OBSESSIONAL THOUGHTS AND ACTS: ICD-10-CM

## 2022-05-25 RX ORDER — PAROXETINE HYDROCHLORIDE 12.5 MG/1
12.5 TABLET, FILM COATED, EXTENDED RELEASE ORAL EVERY MORNING
Qty: 90 TABLET | Refills: 0 | Status: SHIPPED | OUTPATIENT
Start: 2022-05-25 | End: 2022-07-25 | Stop reason: SDUPTHER

## 2022-05-25 NOTE — TELEPHONE ENCOUNTER
----- Message from Sumaya Almeida sent at 5/25/2022 10:46 AM EDT -----  Regarding: Refill Request  PARoxetine (PAXIL-CR) 12 5 mg 24 hr tablet    [facility] [id number] Larisa Willams, [address]   Phone:  [phone]  Fax:  [phone]      Medication was sent to wrong pharmacy use the above one and I updated his preferences      Next visit Gladys Masterson 7/25/2022

## 2022-06-15 ENCOUNTER — TELEPHONE (OUTPATIENT)
Dept: PSYCHIATRY | Facility: CLINIC | Age: 42
End: 2022-06-15

## 2022-06-15 DIAGNOSIS — F42.2 MIXED OBSESSIONAL THOUGHTS AND ACTS: Primary | ICD-10-CM

## 2022-06-15 NOTE — TELEPHONE ENCOUNTER
Lurasidone HCl (Latuda) 60 MG TABS  Wants to decrease medication to the 40 mg  Jerald Wilson this was discussed previously about decreasing the medication  He is currently taking the 60 mg and is unable to break the pill to get it to the 40 mg  His pharmacy is [address] [facility], [address] [facility]  His next appointment is 7/25/2022    Please advise if there is anything I can do

## 2022-06-16 ENCOUNTER — TELEPHONE (OUTPATIENT)
Dept: PSYCHIATRY | Facility: CLINIC | Age: 42
End: 2022-06-16

## 2022-06-16 NOTE — TELEPHONE ENCOUNTER
Pharmacist called to question the Lorene Paiz prescription  Pt was to decrease to the 40 mg of Latuda, the script was written for 40 mg but to take half a tablet for 20 mg, (he requested the 40 mg of Latuda yesterday)    [facility] will require a new script sent to them for the 40 mg

## 2022-06-17 DIAGNOSIS — F42.2 MIXED OBSESSIONAL THOUGHTS AND ACTS: Primary | ICD-10-CM

## 2022-07-25 ENCOUNTER — TELEMEDICINE (OUTPATIENT)
Dept: PSYCHIATRY | Facility: CLINIC | Age: 42
End: 2022-07-25
Payer: COMMERCIAL

## 2022-07-25 DIAGNOSIS — T50.905D ADVERSE EFFECT OF DRUG, SUBSEQUENT ENCOUNTER: ICD-10-CM

## 2022-07-25 DIAGNOSIS — F42.2 MIXED OBSESSIONAL THOUGHTS AND ACTS: Primary | ICD-10-CM

## 2022-07-25 PROCEDURE — 99214 OFFICE O/P EST MOD 30 MIN: CPT | Performed by: NURSE PRACTITIONER

## 2022-07-25 RX ORDER — PAROXETINE HYDROCHLORIDE HEMIHYDRATE 12.5 MG/1
12.5 TABLET, FILM COATED, EXTENDED RELEASE ORAL
Qty: 30 TABLET | Refills: 2 | Status: SHIPPED | OUTPATIENT
Start: 2022-07-25 | End: 2022-10-17 | Stop reason: SDUPTHER

## 2022-07-25 NOTE — PSYCH
PROGRESS NOTE        [facility]      Name and Date of Birth:  Jaclyn Kim 46 y o  1980    Date of Visit: 07/25/22      Jaclyn Kim was seen today for medication management and brief psychotherapy  Virtual Regular Visit    After connecting through [employer], this patient was identified by name and date of birth, was informed that this is a telemedicine visit, and that it is being conducted through [facility], which this patient was informed is a secure HIPPA-compliant platform; this patient agreed to proceed  During this visit, I was alone in my office and my office door was closed  This patient acknowledged consent and understanding of privacy and security of this video platform  This patient gives consent to continue and knows that He can discontinue the visit at any time  Pt understands that this virtual appointment is a billable service  Pt verifies that He is physically located in [place], the [facility] in which I am licensed as an APN with prescriptive authority  Time spent on psychotherapy:  minutes    Treatment modality:   Medication management   Medication education  Supportive psychotherapy  Encouraged and reinforced adaptive behavior          SUBJECTIVE: pt reports taking psychiatric medications as prescribed  Denies return of obsessive checking and rechecking  Pt decreased paroxetine CR to 12 5 mg qd, when he stopped taking it completely he became short-tempered and snappy so he restarted it  Would like to decrease Latuda dose from current 40 mg to 20 mg  Thinks libido a little better but not like it used to be      4/18/2022: aking medications as prescribed  Denies checking and rechecking, doing well, not depressed  However, has been having trouble with decreased libido and inability to sustain erection, urologist said it may be caused by paroxetine; also ordered labs  FSH/LH, testosterone, and others (in chart)     Pt knows his symptoms, and would like to try to decrease paroxetine by 50%, and only take 1 tablet daily instead of 2, but ifhe  feels return of his symptoms of obsessive checking and re-checking, he will increase back to two tablets daily (takes 1 BID now)  He and Gary Leon and the kids stayed home for vacation rather than going to [facility], and really enjoyed themselves  Continue current medications:               Latuda 60 mg tablet - take 1 tablet by mouth daily before bedtime, take with food              paroxetine CR 25 mg tablet - take 1 tablet by mouth two times daily  (Attempt to decrease paroxetine CR 25 mg BID to qd; however, if symptoms return, increase dose to BID        He denies suicidal ideation, intent or plan at present, has no suicidal ideation, intent or plan at present  He denies any auditory hallucinations and visual hallucinations, denies any other delusional thinking, denies any delusional thinking  He denies any side effects from medications      HPI ROS Appetite Changes and Sleep:   Appetite - normal    Sleep - normal    Review Of Systems:      Constitutional Negative   ENT Negative   Cardiovascular Negative   Respiratory Negative   Gastrointestinal Negative   Genitourinary Negative   Musculoskeletal Negative   Integumentary Negative   Neurological Negative   Endocrine Negative   Other Symptoms Negative and None       Laboratory Results: No results found for this or any previous visit  Substance Abuse History:    Social History     Substance and Sexual Activity   Drug Use Not on file       Family Psychiatric History:     No family history on file      The following portions of the patient's history were reviewed and updated as appropriate: past family history, past medical history, past social history, past surgical history and problem list     Social History     Socioeconomic History    Marital status: /Civil Union     Spouse name: Not on file    Number of children: Not on file    Years of education: Not on file    Highest education level: Not on file   Occupational History    Not on file   Tobacco Use    Smoking status: Not on file    Smokeless tobacco: Not on file   Substance and Sexual Activity    Alcohol use: Not on file    Drug use: Not on file    Sexual activity: Not on file   Other Topics Concern    Not on file   Social History Narrative    Not on file     Social Determinants of Health     Financial Resource Strain: Not on file   Food Insecurity: Not on file   Transportation Needs: Not on file   Physical Activity: Not on file   Stress: Not on file   Social Connections: Not on file   Intimate Partner Violence: Not on file   Housing Stability: Not on file     Social History     Social History Narrative    Not on file        Social History    None             OBJECTIVE:     Mental Status Evaluation:    Appearance age appropriate, casually dressed   Behavior pleasant, cooperative   Speech normal rate, rhythm, volume   Mood Euthymic    Affect Normal range and intensity, appropriate   Thought Processes  Organized, goal-directed   Associations intact associations   Thought Content No overt delusions   Perceptual Disturbances: No auditory hallucinations, no visual hallucinations   Abnormal Thoughts  Risk Potential Suicidal ideation - None  Homicidal ideation - None  Potential for aggression - No   Orientation oriented to person, place, date and situation   Memory recent and remote memory grossly intact   Consciousness alert and awake   Attention Span attention span and concentration are age appropriate   Intellect Not formally assessed   Insight intact   Judgement intact    Muscle Strength and  Gait muscle strength and tone were normal, gait not observed   Language no difficulty naming common objects, repeating a phrase   Fund of Knowledge displays adequate knowledge of current events, past history, vocabulary               The patient's chart was reviewed for relevant lab reports and recent encounters with other providers  Medications, treatment progress, and current treatment plan that was enacted on 4/18/2022 and due for review/update on 10/18/2022 were reviewed with the patient  The treatment plan was updated today with the patient who verbally agreed with the updated plan  Today's treatment plan is due for review/update NLT 1/25/2023  Pt and writer were unable to sign plan because this was a telemedicine appointment  Treatment plan goals discussed in this encounter: continue to decrease obsessive thoughts and compulsive behaviors, and attempt to decrease paroxetine CR dose without return of symptoms        Assessment/Plan:       Diagnoses and all orders for this visit:    Mixed obsessional thoughts and acts  -     lurasidone (Latuda) 20 mg tablet; Take 1 tablet (20 mg total) by mouth daily with dinner  -     PARoxetine (PAXIL-CR) 12 5 mg 24 hr tablet;  Take 1 tablet (12 5 mg total) by mouth daily at bedtime    Adverse effect of drug, subsequent encounter          Treatment Recommendations/Precautions:    Dose decrease: Latuda 20 mg tablet - take 1 tablet by mouth daily before bedtime, take with food (previous dose = 40 mg)    Continue current medication/dose:              paroxetine CR 25 mg tablet - take 1 tablet by mouth daily    Risks/Benefits       Risks, Benefits And Possible Side Effects Of Medications:     Risks, benefits, and possible side effects of medications explained to patient and patient verbalizes understanding and agreement for treatment      Controlled Medication Discussion:      Not applicable

## 2022-07-25 NOTE — PATIENT INSTRUCTIONS
Dose decrease: Latuda 20 mg tablet - take 1 tablet by mouth daily before bedtime, take with food (previous dose = 40 mg)    Continue current medication/dose:              paroxetine CR 25 mg tablet - take 1 tablet by mouth daily

## 2022-07-25 NOTE — BH TREATMENT PLAN
TREATMENT PLAN         [facility] [employer]    Name and Date of Birth:  Leslie Bales 46 y o  1980    Date of Treatment Plan: July 25, 2022    Diagnosis/Diagnoses:    1  Mixed obsessional thoughts and acts    2  Adverse effect of drug, subsequent encounter        Strengths/Personal Resources for Self-Care: ability to communicate well, good understanding of illness, motivation for treatment      Area/Areas of need (in own words): obsessive-compulsive symptoms  1          Long Term Goal: continue to decrease obsessive thoughts and compulsive behaviors  Target date: 1/25/2023  Person/Persons responsible for completion of goal: lauren Carrera     2          Short Term Objective (s) - How will we reach this goal?:   A  Provider new recommended medication/dosage changes and/or continue medication(s): Paxil CR,  Latuda  B  Attempt to decrease and possibly stop Latuda   B  Take medications as prescribed, attend scheduled appts  Target date: 1/25/2023  Person/Persons Responsible for Completion of Goal: lauren Carrera     Progress Towards Goals: progressing well     Treatment Modality: medication management every 12 weeks     Review due 120 - 180 days from date of this plan: 1/25/2023  Expected length of service: continuing treatment  My Physician/PA/NP and I have developed this plan together and I agree to work on the goals and objectives  I understand the treatment goals that were developed for my treatment

## 2022-10-17 ENCOUNTER — TELEMEDICINE (OUTPATIENT)
Dept: PSYCHIATRY | Facility: CLINIC | Age: 42
End: 2022-10-17

## 2022-10-17 DIAGNOSIS — F42.2 MIXED OBSESSIONAL THOUGHTS AND ACTS: Primary | ICD-10-CM

## 2022-10-17 RX ORDER — PAROXETINE HYDROCHLORIDE HEMIHYDRATE 12.5 MG/1
12.5 TABLET, FILM COATED, EXTENDED RELEASE ORAL
Qty: 30 TABLET | Refills: 5 | Status: SHIPPED | OUTPATIENT
Start: 2022-10-17

## 2022-10-17 NOTE — PSYCH
This note was not shared with the patient due to privacy exception: note includes other individuals    Kyleigh      Name and Date of Birth:  Jaclyn Kim 46 y o  1980    Date of Visit: 10/17/22      Jaclyn Kim was seen today for medication management and brief psychotherapy  Virtual Regular Visit    After connecting through [employer], this patient was identified by name and date of birth, was informed that this is a telemedicine visit, and that it is being conducted through [facility], which this patient was informed is a secure HIPPA-compliant platform; this patient agreed to proceed  During this visit, I was alone in my office and my office door was closed  This patient acknowledged consent and understanding of privacy and security of this video platform  This patient gives consent to continue and knows that He can discontinue the visit at any time  Pt understands that this virtual appointment is a billable service  Pt verifies that He is physically located in [place], the [facility] in which I am licensed as an APN with prescriptive authority        Time spent on psychotherapy: 5 minutes    Treatment modality:   Medication management   Medication education          SUBJECTIVE: pt reports taking psychiatric medications as prescribed, denies side effects other than as noted  Taking Latuda 20 mg tab with dinner, easier to take it then than at HS  No increase in symptoms (obsessive checking)  Continues to take paroxetine 12 5 mg qHS - snappiness etc has resolved  Deb Damme and the kids are fine  Appetite and sleep ok  Wants to keep same meds/doses      7/25/2022: pt reports taking psychiatric medications as prescribed  Denies return of obsessive checking and rechecking  Pt decreased paroxetine CR to 12 5 mg qd, when he stopped taking it completely he became short-tempered and snappy so he restarted it  Would like to decrease Latuda dose from current 40 mg to 20 mg  Thinks libido a little better but not like it used to be   Dose decrease: Latuda 20 mg tablet - take 1 tablet by mouth daily before bedtime, take with food (previous dose = 40 mg)   Continue current medication/dose:              paroxetine CR 25 mg tablet - take 1 tablet by mouth daily        4/18/2022: taking medications as prescribed  Denies checking and rechecking, doing well, not depressed  However, has been having trouble with decreased libido and inability to sustain erection, urologist said it may be caused by paroxetine; also ordered labs  FSH/LH, testosterone, and others (in chart)  Pt knows his symptoms, and would like to try to decrease paroxetine by 50%, and only take 1 tablet daily instead of 2, but ifhe  feels return of his symptoms of obsessive checking and re-checking, he will increase back to two tablets daily (takes 1 BID now)  He and Merlin Morel and the kids stayed home for vacation rather than going to [facility], and really enjoyed themselves    Continue current medications:               Latuda 60 mg tablet - take 1 tablet by mouth daily before bedtime, take with food              paroxetine CR 25 mg tablet - take 1 tablet by mouth two times daily  (Attempt to decrease paroxetine CR 25 mg BID to qd; however, if symptoms return, increase dose to BID           He denies suicidal ideation, intent or plan at present, has no suicidal ideation, intent or plan at present  He denies any auditory hallucinations and visual hallucinations, denies any other delusional thinking, denies any delusional thinking  He denies any side effects from medications        HPI ROS Appetite Changes and Sleep:   Appetite - normal    Sleep - normal    Review Of Systems:      Constitutional Negative   ENT Negative   Cardiovascular Negative   Respiratory Negative   Gastrointestinal Negative   Genitourinary Negative   Musculoskeletal Negative   Integumentary Negative   Neurological Negative   Endocrine Negative   Other Symptoms Negative and None       Laboratory Results: No results found for this or any previous visit  Substance Abuse History:    Social History     Substance and Sexual Activity   Drug Use Not on file       Family Psychiatric History:     No family history on file      The following portions of the patient's history were reviewed and updated as appropriate: past family history, past medical history, past social history, past surgical history and problem list     Social History     Socioeconomic History   • Marital status: /Civil Union     Spouse name: Not on file   • Number of children: Not on file   • Years of education: Not on file   • Highest education level: Not on file   Occupational History   • Not on file   Tobacco Use   • Smoking status: Not on file   • Smokeless tobacco: Not on file   Substance and Sexual Activity   • Alcohol use: Not on file   • Drug use: Not on file   • Sexual activity: Not on file   Other Topics Concern   • Not on file   Social History Narrative   • Not on file     Social Determinants of Health     Financial Resource Strain: Not on file   Food Insecurity: Not on file   Transportation Needs: Not on file   Physical Activity: Not on file   Stress: Not on file   Social Connections: Not on file   Intimate Partner Violence: Not on file   Housing Stability: Not on file     Social History     Social History Narrative   • Not on file        Social History    None             OBJECTIVE:     Mental Status Evaluation:    Appearance age appropriate, casually dressed   Behavior pleasant, cooperative   Speech normal rate, rhythm, volume   Mood Generally euthymic    Affect Normal range and intensity, appropriate   Thought Processes Organized, goal-directed   Associations intact associations   Thought Content No overt delusions   Perceptual Disturbances: No auditory hallucinations, no visual hallucinations   Abnormal Thoughts  Risk Potential Suicidal ideation - None  Homicidal ideation - None  Potential for aggression - No   Orientation oriented to person, place, date and situation   Memory recent and remote memory grossly intact   Consciousness alert and awake   Attention Span attention span and concentration are age appropriate   Intellect Not formally assessed   Insight intact   Judgement intact    Muscle Strength and  Gait muscle strength and tone were normal, gait not observed   Language no difficulty naming common objects, repeating a phrase   Fund of Knowledge displays adequate knowledge of current events, past history, vocabulary               The patient's chart was reviewed for relevant lab reports and recent encounters with other providers  Medications, treatment progress, and treatment plan enacted on 7/25/2022 were reviewed with the patient  Current treatment plan is due for review/update on NLT 1/25/2023    Treatment plan goals discussed in this encounter: continue to decrease obsessive thoughts and compulsive behaviors        Assessment/Plan:       Diagnoses and all orders for this visit:    Mixed obsessional thoughts and acts          Treatment Recommendations/Precautions:    Continue current medications/doses:   Latuda 20 mg tablet - take 1 tablet by mouth daily with dinner               paroxetine CR 12 5 mg tablet - take 1 tablet by mouth daily    Risks/Benefits      Risks, Benefits And Possible Side Effects Of Medications:    Risks, benefits, and possible side effects of medications explained to patient and patient verbalizes understanding and agreement for treatment      Controlled Medication Discussion:     Not applicable

## 2022-11-15 NOTE — PATIENT INSTRUCTIONS
Continue current medications/doses:   Latuda 20 mg tablet - take 1 tablet by mouth daily with dinner               paroxetine CR 12 5 mg tablet - take 1 tablet by mouth daily

## 2023-01-26 NOTE — PSYCH
This note was not shared with the patient due to reasonable likelihood of causing patient harm    PROGRESS NOTE        [facility]      Name and Date of Birth:  Eneida Castellanos 38 y o  1980    Date of Visit: 01/26/23    SUBJECTIVE:    Haven Alcantara presents for follow-up and medication management today  He is well-known to this office and has been seen by provider who has since retired  He reports that he first presented to this office in approximately 2008  He explains that he had started nursing school and began to have symptoms of OCD  He reports that he was having obsessive thoughts and compulsions while driving  He then began locking doors several times and over checking medications while in school  Over the last few weeks he has had an increase in psychosocial stressors  His mother-in-law who next-door passed away  He reports that following this he began to focus on conversations that he had with his mother-in-law and with his father-in-law  He reported having some increase in OCD symptoms and therefore he did increase his medication from Latuda 20 mg to Latuda 40 mg, and Paxil 12-1/2 to 25  He reports that he has been on higher dosing of his medication in the past and he did have some difficulty obtaining an earlier appointment in office  He currently works as a transport nurse  He lives with his wife and 2 children a son age 6 and a daughter age 15  He reports that he has been sleeping well and has been trying to focus on getting adequate nutrition  He reports that he feels physically well  He notes that he has struggled with his weight in the past   He has a history of asthma  He also has a history of GERD and reports that he is going to follow-up with his GI specialist next week  He denies any significant depressive, anxiety, or OCD symptoms        He denies suicidal ideation, intent or plan at present, has no suicidal ideation, intent or plan at present  He denies any auditory hallucinations and visual hallucinations, denies any other delusional thinking, denies any delusional thinking  He denies any side effects from medications    HPI ROS Appetite Changes and Sleep: normal appetite, normal sleep    Review Of Systems:      Constitutional Negative   ENT Negative   Cardiovascular Negative   Respiratory Negative   Gastrointestinal Negative   Genitourinary Negative   Musculoskeletal Negative   Integumentary Negative   Neurological Negative   Endocrine Negative   Other Symptoms Negative and None       Laboratory Results: No results found for this or any previous visit  Substance Abuse History:    Social History     Substance and Sexual Activity   Drug Use Not on file       Family Psychiatric History:     No family history on file      The following portions of the patient's history were reviewed and updated as appropriate: past family history, past medical history, past social history, past surgical history and problem list     Social History     Socioeconomic History   • Marital status: /Civil Union     Spouse name: Not on file   • Number of children: Not on file   • Years of education: Not on file   • Highest education level: Not on file   Occupational History   • Not on file   Tobacco Use   • Smoking status: Not on file   • Smokeless tobacco: Not on file   Substance and Sexual Activity   • Alcohol use: Not on file   • Drug use: Not on file   • Sexual activity: Not on file   Other Topics Concern   • Not on file   Social History Narrative   • Not on file     Social Determinants of Health     Financial Resource Strain: Not on file   Food Insecurity: Not on file   Transportation Needs: Not on file   Physical Activity: Not on file   Stress: Not on file   Social Connections: Not on file   Intimate Partner Violence: Not on file   Housing Stability: Not on file     Social History     Social History Narrative   • Not on file        Social History    None             OBJECTIVE:     Mental Status Evaluation:    Appearance age appropriate, casually dressed   Behavior pleasant, cooperative   Speech normal volume, normal pitch   Mood Euthymic   Affect Mood Congruent    Thought Processes logical   Associations intact associations   Thought Content normal   Perceptual Disturbances: none   Abnormal Thoughts  Risk Potential Suicidal ideation - None  Homicidal ideation - None  Potential for aggression - No   Orientation oriented to person, place, time/date and situation   Memory recent and remote memory grossly intact   Cosciousness alert and awake   Attention Span attention span and concentration are age appropriate   Intellect Appears to be of Average Intelligence   Insight age appropriate    Judgement good    Muscle Strength and  Gait muscle strength and tone were normal   Language no difficulty naming common objects   Fund of Knowledge displays adequate knowledge of current events   Pain none   Pain Scale 0       Assessment/Plan:       Diagnoses and all orders for this visit:    Mixed obsessional thoughts and acts          Treatment Recommendations/Precautions:    Patient increased his dosing on Latuda and Paxil on his own (he is a nurse, has been on higher doses of his medication in the past, and was unable to get an appointment due to transitioning to a different provider ) He reports that this increase has been helpful for his symptoms  He would like to continue with current dosing  Continue current medications:    Latuda 40 mg QD (with dinner)  Paxil 25 mg QD     We will  Follow up in 1 month or sooner if questions or concerns arise  He is aware of emergent vs non-emergent mental health resources  He is able to contract for safety at this time       Risks/Benefits      Risks, Benefits And Possible Side Effects Of Medications:    Risks, benefits, and possible side effects of medications explained to patient and patient verbalizes understanding and agreement for treatment      Controlled Medication Discussion:     Not applicable    Psychotherapy Provided:     Individual psychotherapy provided: No

## 2023-01-30 ENCOUNTER — OFFICE VISIT (OUTPATIENT)
Dept: PSYCHIATRY | Facility: CLINIC | Age: 43
End: 2023-01-30

## 2023-01-30 DIAGNOSIS — F42.2 MIXED OBSESSIONAL THOUGHTS AND ACTS: Primary | ICD-10-CM

## 2023-01-30 RX ORDER — PAROXETINE HYDROCHLORIDE HEMIHYDRATE 25 MG/1
25 TABLET, FILM COATED, EXTENDED RELEASE ORAL
Qty: 30 TABLET | Refills: 2 | Status: SHIPPED | OUTPATIENT
Start: 2023-01-30

## 2023-01-30 NOTE — BH TREATMENT PLAN
TREATMENT PLAN (Medication Management Only)        [facility]    Name and Date of Birth:  Jillian Silva 38 y o  1980  Date of Treatment Plan: January 30, 2023  Diagnosis/Diagnoses:    1  Mixed obsessional thoughts and acts      Strengths/Personal Resources for Self-Care: supportive family, taking medications as prescribed, ability to adapt to life changes, ability to communicate needs  Area/Areas of need (in own words): obsessive-compulsive symptoms  1  Long Term Goal: maintain stability   Target Date:6 months - 7/30/2023  Person/Persons responsible for completion of goal: Deandre  2  Short Term Objective (s) - How will we reach this goal?:   A  Provider new recommended medication/dosage changes and/or continue medication(s): continue current medications as prescribed  B  N/A   C  N/A  Target Date:6 months - 7/30/2023  Person/Persons Responsible for Completion of Goal: Isabella Mendenhall  Progress Towards Goals: continuing treatment  Treatment Modality: medication management every 3 months  Review due 180 days from date of this plan: 6 months - 7/30/2023  Expected length of service: ongoing treatment  My Physician/PA/NP and I have developed this plan together and I agree to work on the goals and objectives  I understand the treatment goals that were developed for my treatment

## 2023-03-02 NOTE — PSYCH
This note was not shared with the patient due to reasonable likelihood of causing patient harm      Virtual Regular Visit    Problem List Items Addressed This Visit        Other    Obsessive compulsive disorder - Primary    Relevant Medications    Lurasidone HCl (Latuda) 60 MG TABS     Reason for visit is   Chief Complaint   Patient presents with   • Medication Management   • Follow-up     Encounter provider Ruth Castelan PA-C    Provider located at [address] [place] [address] [address] [place] [address]  [address]  [place] [id number]  [phone]    Recent Visits  No visits were found meeting these conditions  Showing recent visits within past 7 days and meeting all other requirements  Today's Visits  Date Type Provider Dept   03/03/23 Telemedicine Ruth Castelan PA-C [facility]   Showing today's visits and meeting all other requirements  Future Appointments  No visits were found meeting these conditions  Showing future appointments within next 150 days and meeting all other requirements       After connecting through [employer], the patient was identified by name and date of birth  Naz Cummings was informed that this is a telemedicine visit and that the visit is being conducted through the [facility]  He agrees to proceed  My office door was closed  No one else was in the room  He acknowledged consent and understanding of privacy and security of the video platform  The patient has agreed to participate and understands they can discontinue the visit at any time  SUBJECTIVE:    Naz Cummings is a 38 y o  male with a history of OCD presents for medication management and follow-up  He reports "things have been getting a lot better with my anxiety "  He notes that he had a old supply of 60 mg of Latuda that he began taking after our last encounter    He reports "my mood has improved and my obsessive thoughts are not as bad " He reports good sleep and fair appetite  He reports that he is doing well at work and at home with his family  He feels physically well  He notes that he is going to make an effort to resume some physical activity  He denies any significant depressive episodes  He denies any significant anxiety  No suicidal or homicidal thought, plan, or intent  No auditory or visual hallucinations  No delusions  No medication side effects  HPI ROS Appetite Changes and Sleep: normal appetite, adequate sleep     Review Of Systems:     Constitutional Negative   ENT Negative   Cardiovascular Negative   Respiratory Negative   Gastrointestinal Negative   Genitourinary Negative   Musculoskeletal Negative   Integumentary Negative   Neurological Negative   Endocrine Negative   Other Symptoms Negative and None         Substance Abuse History:    Social History     Substance and Sexual Activity   Drug Use Not on file       Family Psychiatric History:     No family history on file  Social History     Socioeconomic History   • Marital status: /Civil Union     Spouse name: Not on file   • Number of children: Not on file   • Years of education: Not on file   • Highest education level: Not on file   Occupational History   • Not on file   Tobacco Use   • Smoking status: Not on file   • Smokeless tobacco: Not on file   Substance and Sexual Activity   • Alcohol use: Not on file   • Drug use: Not on file   • Sexual activity: Not on file   Other Topics Concern   • Not on file   Social History Narrative   • Not on file     Social Determinants of Health     Financial Resource Strain: Not on file   Food Insecurity: Not on file   Transportation Needs: Not on file   Physical Activity: Not on file   Stress: Not on file   Social Connections: Not on file   Intimate Partner Violence: Not on file   Housing Stability: Not on file       No past medical history on file  No past surgical history on file      Current Outpatient Medications   Medication Sig Dispense Refill   • Lurasidone HCl (Latuda) 60 MG TABS Take 1 tablet (60 mg total) by mouth daily with dinner 30 tablet 2   • albuterol (Proventil HFA) 90 mcg/act inhaler Inhale 2 puffs every 4 (four) hours as needed     • PARoxetine (PAXIL-CR) 25 mg 24 hr tablet Take 1 tablet (25 mg total) by mouth daily at bedtime 30 tablet 2   • sildenafil (VIAGRA) 100 mg tablet Take 100 mg by mouth       No current facility-administered medications for this visit  No Known Allergies    The following portions of the patient's history were reviewed and updated as appropriate: allergies, current medications, past family history, past medical history, past social history, past surgical history and problem list     OBJECTIVE:     Mental Status Examination:    Appearance age appropriate, casually dressed   Behavior pleasant, cooperative   Speech normal volume, normal pitch   Mood Euthymic   Affect Mood Congruent    Thought Processes logical   Associations intact associations   Thought Content normal   Perceptual Disturbances: none   Abnormal Thoughts  Risk Potential Suicidal ideation - None  Homicidal ideation - None  Potential for aggression - No   Orientation oriented to person, place, time/date and situation   Memory recent and remote memory grossly intact   Cosciousness alert and awake   Attention Span attention span and concentration are age appropriate   Intellect Appears to be of Average Intelligence   Insight age appropriate    Judgement good    Muscle Strength and  Gait muscle strength and tone were normal   Language no difficulty naming common objects   Fund of Knowledge displays adequate knowledge of current events   Pain none   Pain Scale 0         Laboratory Results: No results found for this or any previous visit      Assessment/Plan:       Diagnoses and all orders for this visit:    Mixed obsessional thoughts and acts  -     Lurasidone HCl (Latuda) 60 MG TABS; Take 1 tablet (60 mg total) by mouth daily with dinner          Treatment Recommendations- Risks Benefits      Patient resumed taking Latuda 60 (prescribed by a previous provider)  He would like to continue with current dosing  He feels that his symptoms are well controlled with his current regimen       Continue current medications:     Latuda 60 mg QD (with dinner)  Paxil 25 mg QD      We will  Follow up in 3 months or sooner if questions or concerns arise  He is aware of emergent vs non-emergent mental health resources  He is able to contract for safety at this time           Controlled Medication Discussion: not applicable     Psychotherapy Provided:No  Treatment Plan:    Completed and signed during the session: Not applicable - Treatment Plan not due at this session    I spent 15 minutes with the patient during this visit      Aida Garcia PA-C 03/03/23

## 2023-03-03 ENCOUNTER — TELEMEDICINE (OUTPATIENT)
Dept: PSYCHIATRY | Facility: CLINIC | Age: 43
End: 2023-03-03

## 2023-03-03 DIAGNOSIS — F42.2 MIXED OBSESSIONAL THOUGHTS AND ACTS: Primary | ICD-10-CM

## 2023-03-03 RX ORDER — LURASIDONE HYDROCHLORIDE 60 MG/1
60 TABLET, FILM COATED ORAL
Qty: 30 TABLET | Refills: 2 | Status: SHIPPED | OUTPATIENT
Start: 2023-03-03 | End: 2023-06-01

## 2023-06-26 DIAGNOSIS — F42.2 MIXED OBSESSIONAL THOUGHTS AND ACTS: ICD-10-CM

## 2023-06-26 NOTE — TELEPHONE ENCOUNTER
Patient request refills on medications:      Latuda 60 mg  Paxil CR 25 mg    Pharmacy verified    Next appt Nida Calvillo 7/27/2023

## 2023-06-27 RX ORDER — LURASIDONE HYDROCHLORIDE 60 MG/1
60 TABLET, FILM COATED ORAL
Qty: 30 TABLET | Refills: 2 | Status: SHIPPED | OUTPATIENT
Start: 2023-06-27 | End: 2023-09-25

## 2023-06-27 RX ORDER — PAROXETINE HYDROCHLORIDE HEMIHYDRATE 25 MG/1
25 TABLET, FILM COATED, EXTENDED RELEASE ORAL
Qty: 30 TABLET | Refills: 2 | Status: SHIPPED | OUTPATIENT
Start: 2023-06-27

## 2023-07-26 NOTE — PSYCH
This note was not shared with the patient due to reasonable likelihood of causing patient harm    Virtual Regular Visit    Problem List Items Addressed This Visit        Other    Obsessive compulsive disorder - Primary    Relevant Medications    PARoxetine (PAXIL-CR) 25 mg 24 hr tablet     Reason for visit is   Chief Complaint   Patient presents with   • Medication Management   • Follow-up     Encounter provider Priscilla Espinosa PA-C    Provider located at [address] [place] [address]  [address]  [address] [place] [address]  [phone]    Recent Visits  No visits were found meeting these conditions. Showing recent visits within past 7 days and meeting all other requirements  Today's Visits  Date Type Provider Dept   07/27/23 Telemedicine Priscilla Espinosa PA-C [facility]   Showing today's visits and meeting all other requirements  Future Appointments  No visits were found meeting these conditions. Showing future appointments within next 150 days and meeting all other requirements       After connecting through [employer], the patient was identified by name and date of birth. Vonda Soler was informed that this is a telemedicine visit and that the visit is being conducted through the [employer]. He agrees to proceed. which may not be secure and therefore, might not be HIPAA-compliant. My office door was closed. No one else was in the room. He acknowledged consent and understanding of privacy and security of the video platform. The patient has agreed to participate and understands they can discontinue the visit at any time. SUBJECTIVE:    Vonda Soler is a 38 y.o. male with a history of OCD presents for medication management and follow-up. Ady Harris reports that over the last week he began to have some intrusive thoughts.   He works as a transport nurse and after dropping patient is off he would have thoughts to "go back and check the IV and check certain things even though I already handed the patient off."  He also describes some frequent checking of medications while at work. He had been on a higher dose of Paxil in the past-50 mg. In the last week he did increase his dosing on his own up to the 50 mg dose. He has been tolerating this well with no negative side effects. He does believe that it has been helpful for his intrusive thoughts. He has continued to take his Latuda consistently. He feels physically well. He did make an effort to become more active and eat healthy nutrition. He reports losing 20 pounds in a healthy way. He is now working to maintain his healthier lifestyle. He has been sleeping well. He denies any significant depressive episodes. He denies any significant anxiety.     No suicidal or homicidal thought, plan, or intent.     No auditory or visual hallucinations. No delusions.     No medication side effects. HPI ROS Appetite Changes and Sleep: Good sleep, fair appetite    Review Of Systems:     Constitutional Negative   ENT Negative   Cardiovascular Negative   Respiratory Negative   Gastrointestinal Negative   Genitourinary Negative   Musculoskeletal Negative   Integumentary Negative   Neurological Negative   Endocrine Negative   Other Symptoms Negative and None          Substance Abuse History:    Social History     Substance and Sexual Activity   Drug Use Not on file       Family Psychiatric History:     No family history on file.     Social History     Socioeconomic History   • Marital status: /Civil Union     Spouse name: Not on file   • Number of children: Not on file   • Years of education: Not on file   • Highest education level: Not on file   Occupational History   • Not on file   Tobacco Use   • Smoking status: Not on file   • Smokeless tobacco: Not on file   Substance and Sexual Activity   • Alcohol use: Not on file   • Drug use: Not on file   • Sexual activity: Not on file Other Topics Concern   • Not on file   Social History Narrative   • Not on file     Social Determinants of Health     Financial Resource Strain: Not on file   Food Insecurity: Not on file   Transportation Needs: Not on file   Physical Activity: Not on file   Stress: Not on file   Social Connections: Not on file   Intimate Partner Violence: Not on file   Housing Stability: Not on file       No past medical history on file. No past surgical history on file. Current Outpatient Medications   Medication Sig Dispense Refill   • PARoxetine (PAXIL-CR) 25 mg 24 hr tablet Take 2 tablets (50 mg total) by mouth every morning 60 tablet 2   • albuterol (Proventil HFA) 90 mcg/act inhaler Inhale 2 puffs every 4 (four) hours as needed     • Lurasidone HCl (Latuda) 60 MG TABS Take 1 tablet (60 mg total) by mouth daily with dinner 30 tablet 2   • sildenafil (VIAGRA) 100 mg tablet Take 100 mg by mouth       No current facility-administered medications for this visit.         No Known Allergies    The following portions of the patient's history were reviewed and updated as appropriate: allergies, current medications, past family history, past medical history, past social history, past surgical history and problem list.    OBJECTIVE:     Mental Status Examination:    Appearance age appropriate, casually dressed   Behavior pleasant, cooperative   Speech normal volume, normal pitch   Mood Euthymic   Affect Mood Congruent    Thought Processes logical   Associations intact associations   Thought Content normal   Perceptual Disturbances: none   Abnormal Thoughts  Risk Potential Suicidal ideation - None  Homicidal ideation - None  Potential for aggression - No   Orientation oriented to person, place, time/date and situation   Memory recent and remote memory grossly intact   Cosciousness alert and awake   Attention Span attention span and concentration are age appropriate   Intellect Appears to be of Average Intelligence   Insight age appropriate    Judgement good    Muscle Strength and  Gait muscle strength and tone were normal   Language no difficulty naming common objects   Fund of Knowledge displays adequate knowledge of current events   Pain none   Pain Scale 0             Laboratory Results: No results found for this or any previous visit. Assessment/Plan:       Diagnoses and all orders for this visit:    Mixed obsessional thoughts and acts  -     PARoxetine (PAXIL-CR) 25 mg 24 hr tablet; Take 2 tablets (50 mg total) by mouth every morning          Treatment Recommendations- Risks Benefits      Continue current medications:     Latuda 60 mg QD (with dinner)  Paxil 50 mg CR QD      We will  Follow up in 3 months or sooner if questions or concerns arise. He is aware of emergent vs non-emergent mental health resources. He is able to contract for safety at this time.     Risks, Benefits And Possible Side Effects Of Medications: Discussed    Controlled Medication Discussion: Not applicable    Psychotherapy Provided: No    Treatment Plan:    Completed and signed during the session: Completed and sent via "[employer]"    I spent 24 minutes with the patient during this visit. This note was completed in part utilizing Dragon dictation Software. Grammatical, translation, syntax errors, random word insertions, spelling mistakes, and incomplete sentences may be an occasional consequence of this system secondary to software limitations with voice recognition, ambient noise, and hardware issues. If you have any questions or concerns about the content, text, or information contained within the body of this dictation, please contact the provider for clarification.       Zoe Salazar PA-C 07/27/23

## 2023-07-27 ENCOUNTER — TELEMEDICINE (OUTPATIENT)
Dept: PSYCHIATRY | Facility: CLINIC | Age: 43
End: 2023-07-27
Payer: COMMERCIAL

## 2023-07-27 DIAGNOSIS — F42.2 MIXED OBSESSIONAL THOUGHTS AND ACTS: Primary | ICD-10-CM

## 2023-07-27 PROCEDURE — 99213 OFFICE O/P EST LOW 20 MIN: CPT | Performed by: PHYSICIAN ASSISTANT

## 2023-07-27 RX ORDER — PAROXETINE HYDROCHLORIDE HEMIHYDRATE 25 MG/1
50 TABLET, FILM COATED, EXTENDED RELEASE ORAL EVERY MORNING
Qty: 60 TABLET | Refills: 2 | Status: SHIPPED | OUTPATIENT
Start: 2023-07-27 | End: 2023-10-25

## 2023-07-27 NOTE — BH TREATMENT PLAN
TREATMENT PLAN (Medication Management Only)        [address] [facility]    Name and Date of Birth:  Vonda Soler 38 y.o. 1980  Date of Treatment Plan: July 27, 2023  Diagnosis/Diagnoses:    1. Mixed obsessional thoughts and acts      Strengths/Personal Resources for Self-Care: supportive family, taking medications as prescribed, ability to adapt to life changes, ability to communicate needs, ability to communicate well. Area/Areas of need (in own words): OCD  1. Long Term Goal: maintain control of compulsive behavior. Target Date:6 months - 1/27/2024  Person/Persons responsible for completion of goal: Ady Harris  2. Short Term Objective (s) - How will we reach this goal?:   A. Provider new recommended medication/dosage changes and/or continue medication(s): continue current medications as prescribed. B. N/A.  C. N/A. Target Date:6 months - 1/27/2024  Person/Persons Responsible for Completion of Goal: Ady Harris  Progress Towards Goals: continuing treatment  Treatment Modality: medication management every 3 months  Review due 180 days from date of this plan: 6 months - 1/27/2024  Expected length of service: ongoing treatment  My Physician/PA/NP and I have developed this plan together and I agree to work on the goals and objectives. I understand the treatment goals that were developed for my treatment.

## 2023-10-09 DIAGNOSIS — F42.2 MIXED OBSESSIONAL THOUGHTS AND ACTS: ICD-10-CM

## 2023-10-09 RX ORDER — PAROXETINE HYDROCHLORIDE HEMIHYDRATE 25 MG/1
50 TABLET, FILM COATED, EXTENDED RELEASE ORAL EVERY MORNING
Qty: 60 TABLET | Refills: 2 | Status: SHIPPED | OUTPATIENT
Start: 2023-10-09 | End: 2024-01-07

## 2023-10-09 RX ORDER — LURASIDONE HYDROCHLORIDE 60 MG/1
60 TABLET, FILM COATED ORAL
Qty: 30 TABLET | Refills: 2 | Status: SHIPPED | OUTPATIENT
Start: 2023-10-09 | End: 2024-01-07

## 2023-10-16 NOTE — PSYCH
This note was not shared with the patient due to reasonable likelihood of causing patient harm     Virtual Regular Visit    Problem List Items Addressed This Visit          Other    Obsessive compulsive disorder - Primary     Reason for visit is   Chief Complaint   Patient presents with    Medication Management    Follow-up     Encounter provider Julianna Webber PA-C    Provider located at [facility]  #8  [address] [place] [address]  [phone]    Recent Visits  No visits were found meeting these conditions. Showing recent visits within past 7 days and meeting all other requirements  Today's Visits  Date Type Provider Dept   10/17/23 Telemedicine Julianna Webber PA-C  [facility]   Showing today's visits and meeting all other requirements  Future Appointments  No visits were found meeting these conditions. Showing future appointments within next 150 days and meeting all other requirements       After connecting through [employer], the patient was identified by name and date of birth. Miesha Madrid was informed that this is a telemedicine visit and that the visit is being conducted through the [employer]. He agrees to proceed. My office door was closed. No one else was in the room. He acknowledged consent and understanding of privacy and security of the video platform. The patient has agreed to participate and understands they can discontinue the visit at any time. SUBJECTIVE:    Miesha Madrid is a 38 y.o. male with a history of OCD presents today for virtual follow up. He reports that he has been doing well over the last few months. He states "I am at a good level and I would like to stay here."  He reports that over the last few months he has noticed that he is not checking his son's door.   He reports "that was the last compulsion that I was doing a lot at my house."  He does note that he has been working a lot."  He is trying to take some time for himself but mostly he is catching up on household chores. He shares that he is continuing to have some trouble losing weight but he is still focusing on getting healthy nutrition. He has been sleeping well. He reports that his GERD has been worsening over the last few weeks. He will likely need another endoscopy. He has been consistent with taking his current medication regimen. He would like to remain on the same. No auditory or visual hallucinations. No delusions. No medication side effects. No suicidal or homicidal thought, plan, or intent. HPI ROS Appetite Changes and Sleep: good sleep, fair appetite     Review Of Systems:     Constitutional Negative   ENT Negative   Cardiovascular Negative   Respiratory Negative   Gastrointestinal Negative   Genitourinary Negative   Musculoskeletal Negative   Integumentary Negative   Neurological Negative   Endocrine Negative   Other Symptoms Negative and None           Substance Abuse History:    Social History     Substance and Sexual Activity   Drug Use Not on file       Family Psychiatric History:     No family history on file.     Social History     Socioeconomic History    Marital status: /Civil Union     Spouse name: Not on file    Number of children: Not on file    Years of education: Not on file    Highest education level: Not on file   Occupational History    Not on file   Tobacco Use    Smoking status: Not on file    Smokeless tobacco: Not on file   Substance and Sexual Activity    Alcohol use: Not on file    Drug use: Not on file    Sexual activity: Not on file   Other Topics Concern    Not on file   Social History Narrative    Not on file     Social Determinants of Health     Financial Resource Strain: Not on file   Food Insecurity: Not on file   Transportation Needs: Not on file   Physical Activity: Not on file   Stress: Not on file   Social Connections: Not on file Intimate Partner Violence: Not on file   Housing Stability: Not on file       No past medical history on file. No past surgical history on file. Current Outpatient Medications   Medication Sig Dispense Refill    albuterol (Proventil HFA) 90 mcg/act inhaler Inhale 2 puffs every 4 (four) hours as needed      Lurasidone HCl (Latuda) 60 MG TABS Take 1 tablet (60 mg total) by mouth daily with dinner 30 tablet 2    pantoprazole (PROTONIX) 40 mg tablet       PARoxetine (PAXIL-CR) 25 mg 24 hr tablet Take 2 tablets (50 mg total) by mouth every morning 60 tablet 2    sildenafil (VIAGRA) 100 mg tablet Take 100 mg by mouth       No current facility-administered medications for this visit.         No Known Allergies    The following portions of the patient's history were reviewed and updated as appropriate: allergies, current medications, past family history, past medical history, past social history, past surgical history, and problem list.    OBJECTIVE:     Mental Status Examination:    Appearance age appropriate, casually dressed   Behavior pleasant, cooperative, calm   Speech normal volume, normal pitch   Mood Euthymic   Affect Mood Congruent    Thought Processes logical   Associations intact associations   Thought Content normal   Perceptual Disturbances: none   Abnormal Thoughts  Risk Potential Suicidal ideation - None  Homicidal ideation - None  Potential for aggression - No   Orientation oriented to person, place, time/date and situation   Memory recent and remote memory grossly intact   Cosciousness alert and awake   Attention Span attention span and concentration are age appropriate   Intellect Appears to be of Average Intelligence   Insight age appropriate    Judgement good    Muscle Strength and  Gait muscle strength and tone were normal   Language no difficulty naming common objects   Fund of Knowledge displays adequate knowledge of current events   Pain none   Pain Scale 0           Laboratory Results: No results found for this or any previous visit. Assessment/Plan:       Diagnoses and all orders for this visit:    Mixed obsessional thoughts and acts    Other orders  -     pantoprazole (PROTONIX) 40 mg tablet          Treatment Recommendations- Risks Benefits      Continue current medications:     Latuda 60 mg QD (with dinner of at least 350 cals for optimal absorption)  Paxil 50 mg CR QD      We will  Follow up in 6 months or sooner if questions or concerns arise. He is aware of emergent vs non-emergent mental health resources. He is able to contract for safety at this time. Risks, Benefits And Possible Side Effects Of Medications:  Discussed    Controlled Medication Discussion:  not applicable       Psychotherapy Provided: no    Treatment Plan:    Completed and signed during the session: Not applicable - Treatment Plan not due at this session    I spent 23 minutes with the patient during this visit. This note was completed in part utilizing Dragon dictation Software. Grammatical, translation, syntax errors, random word insertions, spelling mistakes, and incomplete sentences may be an occasional consequence of this system secondary to software limitations with voice recognition, ambient noise, and hardware issues. If you have any questions or concerns about the content, text, or information contained within the body of this dictation, please contact the provider for clarification.      T[place] PA-C 10/17/23

## 2023-10-17 ENCOUNTER — TELEMEDICINE (OUTPATIENT)
Dept: PSYCHIATRY | Facility: CLINIC | Age: 43
End: 2023-10-17
Payer: COMMERCIAL

## 2023-10-17 DIAGNOSIS — F42.2 MIXED OBSESSIONAL THOUGHTS AND ACTS: Primary | ICD-10-CM

## 2023-10-17 PROCEDURE — 99214 OFFICE O/P EST MOD 30 MIN: CPT | Performed by: PHYSICIAN ASSISTANT

## 2023-10-17 RX ORDER — PANTOPRAZOLE SODIUM 40 MG/1
TABLET, DELAYED RELEASE ORAL
COMMUNITY

## 2023-10-17 NOTE — BH CRISIS PLAN
Client Name: Jeff Chapa       Client YOB: 1980  : 1980    Treatment Team (include name and contact information):     Psychotherapist: N/A    Psychiatrist: [address] [facility] Provider  [place] Nam, [address] [facility] [address]      Type of Plan   * Child plans (children 15 yo and younger) must be completed and signed by the child's legal guardian   * Plans for all individuals 15 yo and above must be signed by the client. Plan Type: adolescent/adult (14 and over) Initial      My Personal Strengths are (in the client's own words):  "I don't let things bog me down, I can roll with high stress situations."  The stressors and triggers that may put me at risk are:  "Finances"    Coping skills I can use to keep myself calm and safe: Other (describe)"taking deep breathes"    Coping skills/supports I can use to maintain abstinence from substance use:   Not Applicable    The people that provide me with help and support: (Include name, contact, and how they can help)   Support person #1: My wife    * Phone number: in cell phone    * How can they help me? "Calms me and tells me things will be okay"   Support person #2:My mom    * Phone number: in cell phone    * How can they help me? "Helps with family stuff"         In the past, the following has helped me in times of crisis:    Other: talking with my supports, using coping skills      If it is an emergency and you need immediate help, call     If there is a possibility of danger to yourself or others, call the following crisis hotline resources:     Adult Crisis Numbers  Suicide Prevention Hotline - Dial   [facility]: [address]: [address] [facility] [address]: [address]: [phone]  [address] [place] [address] Street: [address]: [address]: [address]: [phone] (daytime). [phone] (after hours, weekends, holidays)     Child/Adolescent Crisis Numbers   [facility]: [address] [facility]: [phone]   [place]: [phone]   [facility]: [phone]    Please note: Some [facility] do not have a separate number for Child/Adolescent specific crisis. If your county is not listed under Child/Adolescent, please call the adult number for your county     National Talk to Text Line   [employer] - [phone]    In the event your feelings become unmanageable, and you cannot reach your support system, you will call 911 immediately or go to the nearest hospital emergency room.

## 2024-01-10 DIAGNOSIS — F42.2 MIXED OBSESSIONAL THOUGHTS AND ACTS: ICD-10-CM

## 2024-01-10 RX ORDER — LURASIDONE HYDROCHLORIDE 60 MG/1
60 TABLET, FILM COATED ORAL
Qty: 30 TABLET | Refills: 2 | Status: SHIPPED | OUTPATIENT
Start: 2024-01-10 | End: 2024-04-09

## 2024-01-10 RX ORDER — PAROXETINE HYDROCHLORIDE HEMIHYDRATE 25 MG/1
50 TABLET, FILM COATED, EXTENDED RELEASE ORAL EVERY MORNING
Qty: 60 TABLET | Refills: 2 | Status: SHIPPED | OUTPATIENT
Start: 2024-01-10 | End: 2024-04-09

## 2024-01-10 NOTE — TELEPHONE ENCOUNTER
Medication Refill Request     Name of Medication Lurasidone HCl (Latuda) 60 MG TABS ()   Dose/Frequency Take 1 tablet (60 mg total) by mouth daily with dinner   Quantity 30  Verified pharmacy   [x]  Verified ordering Provider   [x]  Does patient have enough for the next 3 days? Yes [] No [x]  Does patient have a follow-up appointment scheduled? Yes [] No [x]   If so when is appointment: 24 @ 8:30 am    Medication Refill Request     Name of Medication PARoxetine (PAXIL-CR) 25 mg 24 hr tablet ()   Dose/Frequency      Take 2 tablets (50 mg total) by mouth every morning     Quantity 60  Verified pharmacy   [x]  Verified ordering Provider   [x]  Does patient have enough for the next 3 days? Yes [] No [x]  Does patient have a follow-up appointment scheduled? Yes [x] No []   If so when is appointment: 24

## 2024-04-16 ENCOUNTER — TELEMEDICINE (OUTPATIENT)
Dept: PSYCHIATRY | Facility: CLINIC | Age: 44
End: 2024-04-16
Payer: COMMERCIAL

## 2024-04-16 DIAGNOSIS — F42.2 MIXED OBSESSIONAL THOUGHTS AND ACTS: Primary | ICD-10-CM

## 2024-04-16 PROBLEM — R13.12 OROPHARYNGEAL DYSPHAGIA: Status: ACTIVE | Noted: 2024-04-16

## 2024-04-16 PROBLEM — H60.399 INFECTIVE OTITIS EXTERNA: Status: ACTIVE | Noted: 2018-09-21

## 2024-04-16 PROBLEM — D18.00 HEMANGIOMA: Status: ACTIVE | Noted: 2018-10-01

## 2024-04-16 PROBLEM — K21.9 GASTROESOPHAGEAL REFLUX DISEASE WITHOUT ESOPHAGITIS: Status: ACTIVE | Noted: 2024-04-16

## 2024-04-16 PROBLEM — E66.812 CLASS 2 OBESITY: Status: ACTIVE | Noted: 2018-10-01

## 2024-04-16 PROBLEM — B44.9 ASPERGILLOSIS (HCC): Status: ACTIVE | Noted: 2018-10-01

## 2024-04-16 PROBLEM — E66.9 CLASS 2 OBESITY: Status: ACTIVE | Noted: 2018-10-01

## 2024-04-16 PROBLEM — H60.12 CELLULITIS OF LEFT EXTERNAL EAR: Status: ACTIVE | Noted: 2018-09-21

## 2024-04-16 PROBLEM — J30.9 ALLERGIC RHINITIS DUE TO ALLERGEN: Status: ACTIVE | Noted: 2024-04-16

## 2024-04-16 PROCEDURE — 99213 OFFICE O/P EST LOW 20 MIN: CPT | Performed by: PHYSICIAN ASSISTANT

## 2024-04-16 RX ORDER — FLUTICASONE PROPIONATE AND SALMETEROL 250; 50 UG/1; UG/1
POWDER RESPIRATORY (INHALATION)
COMMUNITY
Start: 2024-04-05

## 2024-04-16 RX ORDER — MONTELUKAST SODIUM 10 MG/1
TABLET ORAL
COMMUNITY
Start: 2024-04-05

## 2024-04-16 RX ORDER — LURASIDONE HYDROCHLORIDE 60 MG/1
60 TABLET, FILM COATED ORAL
Qty: 90 TABLET | Refills: 1 | Status: SHIPPED | OUTPATIENT
Start: 2024-04-16 | End: 2024-04-25 | Stop reason: SDUPTHER

## 2024-04-16 RX ORDER — PAROXETINE HYDROCHLORIDE HEMIHYDRATE 25 MG/1
50 TABLET, FILM COATED, EXTENDED RELEASE ORAL EVERY MORNING
Qty: 180 TABLET | Refills: 1 | Status: SHIPPED | OUTPATIENT
Start: 2024-04-16 | End: 2024-10-13

## 2024-04-16 NOTE — PSYCH
This note was not shared with the patient due to reasonable likelihood of causing patient harm     Virtual Regular Visit    Problem List Items Addressed This Visit       Obsessive compulsive disorder - Primary    Relevant Medications    Lurasidone HCl (Latuda) 60 MG TABS    PARoxetine (PAXIL-CR) 25 mg 24 hr tablet     Reason for visit is   Chief Complaint   Patient presents with    Medication Management    Follow-up     Encounter provider Rebeca Castro PA-C    Provider located at [address]  [address]  [facility] [address] [phone]    Recent Visits  No visits were found meeting these conditions.  Showing recent visits within past 7 days and meeting all other requirements  Today's Visits  Date Type Provider Dept   04/16/24 Telemedicine Rebeca Castro PA-C [facility]   Showing today's visits and meeting all other requirements  Future Appointments  No visits were found meeting these conditions.  Showing future appointments within next 150 days and meeting all other requirements       After connecting through [facility], the patient was identified by name and date of birth. Deandre Jo was informed that this is a telemedicine visit and that the visit is being conducted through the Epic Embedded platform. He agrees to proceed.  My office door was closed. No one else was in the room.  He acknowledged consent and understanding of privacy and security of the video platform. The patient has agreed to participate and understands they can discontinue the visit at any time.    SUBJECTIVE:    Deandre Jo is a 43 y.o. male with a history of OCD presents today for virtual follow up.  He reports that he has been doing well over the last few weeks. He denies any significant OCD symptoms. He shares that he had elevated LFT's show up on recent blood work. He has a history of fatty liver in the past. He has been  having some difficulty with weight gain recently.     He has been consistent with taking his current medication regimen.  He would like to remain on the same.    No auditory or visual hallucinations.  No delusions.    No medication side effects.    No suicidal or homicidal thought, plan, or intent.    HPI ROS Appetite Changes and Sleep: good sleep, increased appetite     Review Of Systems:     Constitutional Negative   ENT Negative   Cardiovascular Negative   Respiratory Negative   Gastrointestinal Negative   Genitourinary Negative   Musculoskeletal Negative   Integumentary Negative   Neurological Negative   Endocrine Negative   Other Symptoms Negative and None           Substance Abuse History:    Social History     Substance and Sexual Activity   Drug Use Not on file       Family Psychiatric History:     History reviewed. No pertinent family history.    Social History     Socioeconomic History    Marital status: /Civil Union     Spouse name: Not on file    Number of children: Not on file    Years of education: Not on file    Highest education level: Not on file   Occupational History    Not on file   Tobacco Use    Smoking status: Unknown    Smokeless tobacco: Not on file   Substance and Sexual Activity    Alcohol use: Not on file    Drug use: Not on file    Sexual activity: Not on file   Other Topics Concern    Not on file   Social History Narrative    Not on file     Social Determinants of Health     Financial Resource Strain: Not on file   Food Insecurity: Not on file   Transportation Needs: Not on file   Physical Activity: Not on file   Stress: Not on file   Social Connections: Not on file   Intimate Partner Violence: Not on file   Housing Stability: Not on file       History reviewed. No pertinent past medical history.    History reviewed. No pertinent surgical history.    Current Outpatient Medications   Medication Sig Dispense Refill    Lurasidone HCl (Latuda) 60 MG TABS Take 1 tablet (60 mg total)  by mouth daily with dinner 90 tablet 1    PARoxetine (PAXIL-CR) 25 mg 24 hr tablet Take 2 tablets (50 mg total) by mouth every morning 180 tablet 1    albuterol (Proventil HFA) 90 mcg/act inhaler Inhale 2 puffs every 4 (four) hours as needed      pantoprazole (PROTONIX) 40 mg tablet       sildenafil (VIAGRA) 100 mg tablet Take 100 mg by mouth       No current facility-administered medications for this visit.        No Known Allergies    The following portions of the patient's history were reviewed and updated as appropriate: allergies, current medications, past family history, past medical history, past social history, past surgical history, and problem list.    OBJECTIVE:     Mental Status Examination:    Appearance age appropriate, casually dressed   Behavior pleasant, cooperative, calm   Speech normal volume, normal pitch   Mood Euthymic   Affect Mood Congruent    Thought Processes logical   Associations intact associations   Thought Content normal   Perceptual Disturbances: none   Abnormal Thoughts  Risk Potential Suicidal ideation - None  Homicidal ideation - None  Potential for aggression - No   Orientation oriented to person, place, time/date and situation   Memory recent and remote memory grossly intact   Cosciousness alert and awake   Attention Span attention span and concentration are age appropriate   Intellect Appears to be of Average Intelligence   Insight age appropriate    Judgement good    Muscle Strength and  Gait muscle strength and tone were normal   Language no difficulty naming common objects   Fund of Knowledge displays adequate knowledge of current events   Pain none   Pain Scale 0           Laboratory Results: No results found for this or any previous visit.    Assessment/Plan:       Diagnoses and all orders for this visit:    Mixed obsessional thoughts and acts  -     Lurasidone HCl (Latuda) 60 MG TABS; Take 1 tablet (60 mg total) by mouth daily with dinner  -     PARoxetine (PAXIL-CR) 25 mg  24 hr tablet; Take 2 tablets (50 mg total) by mouth every morning          Treatment Recommendations- Risks Benefits      Continue current medications:     Latuda 60 mg QD (with dinner of at least 350 cals for optimal absorption)  Paxil 50 mg CR QD      We will  Follow up in 6 months or sooner if questions or concerns arise.   He is aware of emergent vs non-emergent mental health resources.   He is able to contract for safety at this time.     Risks, Benefits And Possible Side Effects Of Medications:  Discussed    Controlled Medication Discussion:  not applicable       Psychotherapy Provided: no    Treatment Plan:    Completed and signed during the session: Not applicable - Treatment Plan not due at this session    I spent 24 minutes with the patient during this visit.    This note was completed in part utilizing Dragon dictation Software. Grammatical, translation, syntax errors, random word insertions, spelling mistakes, and incomplete sentences may be an occasional consequence of this system secondary to software limitations with voice recognition, ambient noise, and hardware issues. If you have any questions or concerns about the content, text, or information contained within the body of this dictation, please contact the provider for clarification.     Rebeca Castro PA-C 04/16/24

## 2024-04-25 DIAGNOSIS — F42.2 MIXED OBSESSIONAL THOUGHTS AND ACTS: ICD-10-CM

## 2024-04-25 RX ORDER — LURASIDONE HYDROCHLORIDE 60 MG/1
60 TABLET, FILM COATED ORAL
Qty: 90 TABLET | Refills: 1 | Status: SHIPPED | OUTPATIENT
Start: 2024-04-25 | End: 2024-10-22

## 2024-04-25 NOTE — TELEPHONE ENCOUNTER
Pt state his mail order is out of stock      Medication Refill Request     Name of Medication  Lurasidone HCl (Latuda) 60 MG TABS   Dose/Frequency       Take 1 tablet (60 mg total) by mouth daily with dinner     Quantity 90  Verified pharmacy   [x]  Verified ordering Provider   [x]  Does patient have enough for the next 3 days? Yes [] No [x]  Does patient have a follow-up appointment scheduled? Yes [] No [x]   If so when is appointment: Seen on 04/16/24 @ 2:30 pm      Medication Refill Request     Name of Medication  PARoxetine (PAXIL-CR) 25 mg 24 hr tablet   Dose/Frequency     Take 2 tablets (50 mg total) by mouth every morning     Quantity  180  Verified pharmacy   [x]  Verified ordering Provider   [x]  Does patient have enough for the next 3 days? Yes [] No [x]  Does patient have a follow-up appointment scheduled? Yes [] No [x]   If so when is appointment:

## 2024-06-05 DIAGNOSIS — F42.2 MIXED OBSESSIONAL THOUGHTS AND ACTS: ICD-10-CM

## 2024-06-05 RX ORDER — PAROXETINE HYDROCHLORIDE HEMIHYDRATE 25 MG/1
50 TABLET, FILM COATED, EXTENDED RELEASE ORAL EVERY MORNING
Qty: 180 TABLET | Refills: 0 | Status: SHIPPED | OUTPATIENT
Start: 2024-06-05 | End: 2024-12-02

## 2024-06-05 NOTE — TELEPHONE ENCOUNTER
Medication Refill Request     Name of Medication  PARoxetine (PAXIL-CR) 25 mg 24 hr tablet   Dose/Frequency      Take 2 tablets (50 mg total) by mouth every morning     Quantity 180  Verified pharmacy   [x]  Verified ordering Provider   [x]  Does patient have enough for the next 3 days? Yes [] No [x]  Does patient have a follow-up appointment scheduled? Yes [x] No []   If so when is appointment: 09/16/24 @ 10 am

## 2024-08-09 ENCOUNTER — TELEPHONE (OUTPATIENT)
Dept: PSYCHIATRY | Facility: CLINIC | Age: 44
End: 2024-08-09

## 2024-08-09 NOTE — TELEPHONE ENCOUNTER
Called patient but had to leave voicemail message to call back to reschedule or change his appointment with Rebeca Castro PA-C on 9/16/2024 due to provider changed schedule to virtual on Mondays.

## 2024-09-16 ENCOUNTER — TELEMEDICINE (OUTPATIENT)
Dept: PSYCHIATRY | Facility: CLINIC | Age: 44
End: 2024-09-16
Payer: COMMERCIAL

## 2024-09-16 DIAGNOSIS — F42.2 MIXED OBSESSIONAL THOUGHTS AND ACTS: Primary | ICD-10-CM

## 2024-09-16 PROCEDURE — 99213 OFFICE O/P EST LOW 20 MIN: CPT | Performed by: PHYSICIAN ASSISTANT

## 2024-09-16 RX ORDER — LURASIDONE HYDROCHLORIDE 60 MG/1
60 TABLET, FILM COATED ORAL
Qty: 90 TABLET | Refills: 1 | Status: SHIPPED | OUTPATIENT
Start: 2024-09-16 | End: 2025-03-15

## 2024-09-16 RX ORDER — PAROXETINE HYDROCHLORIDE HEMIHYDRATE 25 MG/1
50 TABLET, FILM COATED, EXTENDED RELEASE ORAL EVERY MORNING
Qty: 180 TABLET | Refills: 1 | Status: SHIPPED | OUTPATIENT
Start: 2024-09-16 | End: 2025-03-15

## 2024-09-16 NOTE — BH TREATMENT PLAN
TREATMENT PLAN (Medication Management Only)        [facility]    Name and Date of Birth:  Deandre Jo 43 y.o. 1980  Date of Treatment Plan: September 16, 2024  Diagnosis/Diagnoses:    1. Mixed obsessional thoughts and acts      Strengths/Personal Resources for Self-Care: supportive family, taking medications as prescribed, ability to adapt to life changes, ability to communicate needs, ability to communicate well.  Area/Areas of need (in own words): obsessive-compulsive symptoms  1. Long Term Goal: maintain acceptable anxiety level.  Target Date:6 months - 3/16/2025  Person/Persons responsible for completion of goal: Deandre  2.  Short Term Objective (s) - How will we reach this goal?:   A. Provider new recommended medication/dosage changes and/or continue medication(s): continue current medications as prescribed.  B. N/A.  C. N/A.  Target Date:6 months - 3/16/2025  Person/Persons Responsible for Completion of Goal: Deandre  Progress Towards Goals: continuing treatment  Treatment Modality: medication management every 6 months  Review due 180 days from date of this plan: 6 months - 3/16/2025  Expected length of service: ongoing treatment  My Physician/PA/NP and I have developed this plan together and I agree to work on the goals and objectives. I understand the treatment goals that were developed for my treatment.

## 2024-09-16 NOTE — BH CRISIS PLAN
Client Name: Deandre Jo       Client YOB: 1980    Robin Safety Plan      Creation Date: 9/16/24 Update Date: 9/16/24   Created By: Rebeca Castro PA-C Last Updated By: Rebeca Castro PA-C      Step 1: Warning Signs:   Warning Signs   nausea   obsessive thoughts            Step 2: Internal Coping Strategies:   Internal Coping Strategies   breathing   taking breaks            Step 3: People and social settings that provide distraction:   Name Contact Information   My wife In cell phone            Step 4: People whom I can ask for help during a crisis:      Name Contact Information    My wife In cell phone      Step 5: Professionals or agencies I can contact during a crisis:      [facility]inican/Agency Name Phone Emergency Contact    Rebeca Castro PA-C In cell phone         Crisis Phone Numbers:   Suicide Prevention Lifeline: Call or Text  917 Crisis Text Line: Text HOME to 568-492   Please note: Some [facility] do not have a separate number for Child/Adolescent specific crisis. If your county is not listed under Child/Adolescent, please call the adult number for your county      Adult Crisis Numbers: Child/Adolescent Crisis Numbers   [facility]: [phone] [facility]: [phone]   [facility]: [phone] [facility]: [phone]   [facility]: [phone] [place]: [phone]   [facility]: [phone] [facility]: [phone]   [facility]: [phone]   [facility]: [phone]   [facility]: [phone]   [facility]: [phone] (daytime) [phone] (after hours, weekends, holidays)      Step 6: Making the environment safer (plan for lethal means safety):   Patient did not identify any lethal methods: Yes     Optional: What is most important to me and worth living for?      Robin Safety Plan. Margarette Treviño and Filiberto Odell. Used with permission of the authors.

## 2024-09-16 NOTE — PSYCH
This note was not shared with the patient due to reasonable likelihood of causing patient harm     Virtual Regular Visit    Problem List Items Addressed This Visit       Obsessive compulsive disorder - Primary     Stable on current regimen of Latuda 60 mg daily with a meal and Paxil CR 50 mg daily.          Relevant Medications    PARoxetine (PAXIL-CR) 25 mg 24 hr tablet    Lurasidone HCl (Latuda) 60 MG TABS     Reason for visit is   Chief Complaint   Patient presents with    Medication Management    Follow-up     Encounter provider Rebeca Castro PA-C    Provider located at [address]  [address]  [facility] [address] [phone]    Recent Visits  No visits were found meeting these conditions.  Showing recent visits within past 7 days and meeting all other requirements  Today's Visits  Date Type Provider Dept   09/16/24 Telemedicine Rebeca Castro PA-C  [facility]   Showing today's visits and meeting all other requirements  Future Appointments  No visits were found meeting these conditions.  Showing future appointments within next 150 days and meeting all other requirements       After connecting through [employer], the patient was identified by name and date of birth. Deandre Jo was informed that this is a telemedicine visit and that the visit is being conducted through the Epic Embedded platform. He agrees to proceed.  My office door was closed. No one else was in the room.  He acknowledged consent and understanding of privacy and security of the video platform. The patient has agreed to participate and understands they can discontinue the visit at any time.    SUBJECTIVE:    Deandre Jo is a 43 y.o. male with a history of OCD presents today for virtual follow up.  He reports that he has been doing well over the last several weeks.  He shares that he has been busy with work, and getting his  "children to their sports and school activities.  He describes his mood as \"good.\"  He shares that his OCD symptoms are well-controlled at this time.  He shares that he has been working hard to improve health with diet and exercise.  He did lose 20 pounds but has started to gain some of that back.    He has been taking his medication consistently and he would like to remain on the same regimen at this time.    No auditory or visual hallucinations.  No delusions.    No medication side effects.    No suicidal or homicidal thought, plan, or intent.    HPI ROS Appetite Changes and Sleep: good sleep, adequate appetite     Review Of Systems:     Constitutional Negative   ENT Negative   Cardiovascular Negative   Respiratory Negative   Gastrointestinal Negative   Genitourinary Negative   Musculoskeletal Negative   Integumentary Negative   Neurological Negative   Endocrine Negative   Other Symptoms Negative and None           Substance Abuse History:    Social History     Substance and Sexual Activity   Drug Use Not on file       Family Psychiatric History:     History reviewed. No pertinent family history.    Social History     Socioeconomic History    Marital status: /Civil Union     Spouse name: Not on file    Number of children: Not on file    Years of education: Not on file    Highest education level: Not on file   Occupational History    Not on file   Tobacco Use    Smoking status: Unknown    Smokeless tobacco: Not on file   Substance and Sexual Activity    Alcohol use: Not on file    Drug use: Not on file    Sexual activity: Not on file   Other Topics Concern    Not on file   Social History Narrative    Not on file     Social Determinants of Health     Financial Resource Strain: Not on file   Food Insecurity: Not on file   Transportation Needs: Not on file   Physical Activity: Not on file   Stress: Not on file   Social Connections: Not on file   Intimate Partner Violence: Not on file   Housing Stability: Not on " file       History reviewed. No pertinent past medical history.    History reviewed. No pertinent surgical history.    Current Outpatient Medications   Medication Sig Dispense Refill    albuterol (Proventil HFA) 90 mcg/act inhaler Inhale 2 puffs every 4 (four) hours as needed      Lurasidone HCl (Latuda) 60 MG TABS Take 1 tablet (60 mg total) by mouth daily with dinner 90 tablet 1    PARoxetine (PAXIL-CR) 25 mg 24 hr tablet Take 2 tablets (50 mg total) by mouth every morning 180 tablet 1    Fluticasone-Salmeterol (Advair) 250-50 mcg/dose inhaler       montelukast (SINGULAIR) 10 mg tablet       pantoprazole (PROTONIX) 40 mg tablet        No current facility-administered medications for this visit.        No Known Allergies    The following portions of the patient's history were reviewed and updated as appropriate: allergies, current medications, past family history, past medical history, past social history, past surgical history, and problem list.    OBJECTIVE:     Mental Status Examination:    Appearance age appropriate, casually dressed   Behavior pleasant, cooperative   Speech normal volume, normal pitch   Mood Euthymic   Affect Mood Congruent    Thought Processes logical   Associations intact associations   Thought Content normal   Perceptual Disturbances: none   Abnormal Thoughts  Risk Potential Suicidal ideation - None  Homicidal ideation - None  Potential for aggression - No   Orientation oriented to person, place, time/date and situation   Memory recent and remote memory grossly intact   Cosciousness alert and awake   Attention Span attention span and concentration are age appropriate   Intellect Appears to be of Average Intelligence   Insight age appropriate    Judgement good    Muscle Strength and  Gait Not assessed due to virtual visit   Language no difficulty naming common objects   Fund of Knowledge displays adequate knowledge of current events   Pain none   Pain Scale 0           Laboratory Results: No  results found for this or any previous visit.    Assessment/Plan:      Assessment & Plan  Mixed obsessional thoughts and acts  Stable on current regimen of Latuda 60 mg daily with a meal and Paxil CR 50 mg daily.     Orders:    PARoxetine (PAXIL-CR) 25 mg 24 hr tablet; Take 2 tablets (50 mg total) by mouth every morning    Lurasidone HCl (Latuda) 60 MG TABS; Take 1 tablet (60 mg total) by mouth daily with dinner         Treatment Recommendations- Risks Benefits      Continue current medications:     Latuda 60 mg QD (with dinner of at least 350 cals for optimal absorption)  Paxil 50 mg CR QD      We will  Follow up in 6 months or sooner if questions or concerns arise.   He is aware of emergent vs non-emergent mental health resources.   He is able to contract for safety at this time.     Risks, Benefits And Possible Side Effects Of Medications:  Discussed    Controlled Medication Discussion:  not applicable       Psychotherapy Provided: no    Treatment Plan:    Completed and signed during the session: Completed but not signed due to virtual visit  I spent 20 minutes with the patient during this visit.    This note was completed in part utilizing Dragon dictation Software. Grammatical, translation, syntax errors, random word insertions, spelling mistakes, and incomplete sentences may be an occasional consequence of this system secondary to software limitations with voice recognition, ambient noise, and hardware issues. If you have any questions or concerns about the content, text, or information contained within the body of this dictation, please contact the provider for clarification.     Rebeca Castro PA-C 09/16/24

## 2025-03-24 ENCOUNTER — TELEPHONE (OUTPATIENT)
Age: 45
End: 2025-03-24

## 2025-03-24 NOTE — TELEPHONE ENCOUNTER
Patient contacted the office to schedule a follow up visit with provider. Patient is now scheduled for 3/31  at 2 virtually.

## 2025-03-31 ENCOUNTER — TELEMEDICINE (OUTPATIENT)
Dept: PSYCHIATRY | Facility: CLINIC | Age: 45
End: 2025-03-31
Payer: COMMERCIAL

## 2025-03-31 DIAGNOSIS — F42.2 MIXED OBSESSIONAL THOUGHTS AND ACTS: Primary | ICD-10-CM

## 2025-03-31 PROCEDURE — 98005 SYNCH AUDIO-VIDEO EST LOW 20: CPT | Performed by: PHYSICIAN ASSISTANT

## 2025-03-31 RX ORDER — LURASIDONE HYDROCHLORIDE 60 MG/1
60 TABLET, FILM COATED ORAL
Qty: 90 TABLET | Refills: 1 | Status: SHIPPED | OUTPATIENT
Start: 2025-03-31 | End: 2025-09-27

## 2025-03-31 RX ORDER — PAROXETINE HYDROCHLORIDE HEMIHYDRATE 25 MG/1
50 TABLET, FILM COATED, EXTENDED RELEASE ORAL EVERY MORNING
Qty: 180 TABLET | Refills: 1 | Status: SHIPPED | OUTPATIENT
Start: 2025-03-31 | End: 2025-09-27

## 2025-03-31 NOTE — ASSESSMENT & PLAN NOTE
Orders:    Lurasidone HCl (Latuda) 60 MG TABS; Take 1 tablet (60 mg total) by mouth daily with dinner    PARoxetine (PAXIL-CR) 25 mg 24 hr tablet; Take 2 tablets (50 mg total) by mouth every morning

## 2025-03-31 NOTE — PSYCH
MEDICATION MANAGEMENT NOTE    Name: Deandre Jo      : 1980      MRN: [id number]  Encounter Provider: Rebeca Castro PA-C  Encounter Date: 3/31/2025   Encounter department: [facility]    Insurance: Payor: [employer] / Plan: [employer] PPO / Product Type: PPO /      Reason for Visit:   Chief Complaint   Patient presents with    Virtual Regular Visit    Follow-up    Medication Management   :  Assessment & Plan  Mixed obsessional thoughts and acts  Continue Latuda 60 mg daily with a meal for OCD  Continue Paxil CR 50 mg daily for OCD  Patient is reporting that the formulation of Paxil that he has been on for several years is no longer covered by his insurance.  He may call and need to switch to the immediate version.  Stable, at goal  Orders:    Lurasidone HCl (Latuda) 60 MG TABS; Take 1 tablet (60 mg total) by mouth daily with dinner    PARoxetine (PAXIL-CR) 25 mg 24 hr tablet; Take 2 tablets (50 mg total) by mouth every morning        Treatment Recommendations:    Educated about diagnosis and treatment modalities. Verbalizes understanding and agreement with the treatment plan.  Discussed self monitoring of symptoms, and symptom monitoring tools.  Discussed medications and if treatment adjustment was needed or desired.  Medication management every 6 months  Aware of 24 hour and weekend coverage for urgent situations accessed by calling [facility] main practice number  I am scheduling this patient out for greater than 3 months: Yes - Patient's stability of symptoms warrant this length of time or no significant changes to treatment plan  If sooner appointment needed, patient will reach out    Medications Risks/Benefits:      Risks, Benefits And Possible Side Effects Of Medications:    Risks, benefits, and possible side effects of medications explained to Deandre and he (or legal representative) verbalizes understanding and agreement for  "treatment.    Controlled Medication Discussion:     Not applicable      History of Present Illness     Deandre is seen today for a follow up for OCD.  He reports \"things have been pretty good.\"  He shares \"I have not had any obsessive issues in a while.\"  He has been doing well at work and at home.  He shares this past weekend one of his close friends passed away after motorcycle accident.  He shares that this has been difficult for him.  He has the support of his family.  Otherwise he reports that things have been going well.  He has been taking his medication consistently.  He reports a stable mood.  He denies any significant depressive or anxiety symptoms.  He is being worked up for fatty liver by his GI specialist and was recently placed on Mounjaro.  He would like to remain on his current psychiatric regimen.      He denies suicidal ideation, intent or plan at present; denies homicidal ideation, intent or plan at present.    He denies auditory hallucinations, denies visual hallucinations, denies delusions.    He denies any side effects from current psychiatric medications.    HPI ROS Appetite Changes and Sleep:     He reports normal sleep, normal appetite, normal energy level    Review Of Systems: A review of systems is obtained and is negative except for the pertinent positives listed in HPI/Subjective above.      Current Rating Scores:     None completed today.    Areas of Improvement: reviewed in HPI/Subjective Section    Past Psychiatric History: (unchanged information from previous note )      Traumatic History: (unchanged information from previous note)        History reviewed. No pertinent past medical history.     History reviewed. No pertinent surgical history.  Allergies: No Known Allergies    Current Outpatient Medications   Medication Sig Dispense Refill    Lurasidone HCl (Latuda) 60 MG TABS Take 1 tablet (60 mg total) by mouth daily with dinner 90 tablet 1    PARoxetine (PAXIL-CR) 25 mg 24 hr tablet " Take 2 tablets (50 mg total) by mouth every morning 180 tablet 1    albuterol (Proventil HFA) 90 mcg/act inhaler Inhale 2 puffs every 4 (four) hours as needed      Fluticasone-Salmeterol (Advair) 250-50 mcg/dose inhaler       montelukast (SINGULAIR) 10 mg tablet       pantoprazole (PROTONIX) 40 mg tablet        No current facility-administered medications for this visit.       Substance Abuse History:    Social History     Substance and Sexual Activity   Alcohol Use None     Social History     Substance and Sexual Activity   Drug Use Not on file       Social History:    Social History     Socioeconomic History    Marital status: /Civil Union     Spouse name: Not on file    Number of children: Not on file    Years of education: Not on file    Highest education level: Not on file   Occupational History    Not on file   Tobacco Use    Smoking status: Unknown    Smokeless tobacco: Not on file   Substance and Sexual Activity    Alcohol use: Not on file    Drug use: Not on file    Sexual activity: Not on file   Other Topics Concern    Not on file   Social History Narrative    Not on file     Social Drivers of Health     Financial Resource Strain: Not on file   Food Insecurity: Not on file   Transportation Needs: Not on file   Physical Activity: Not on file   Stress: Not on file   Social Connections: Not on file   Intimate Partner Violence: Not on file   Housing Stability: Not on file       Family Psychiatric History:     History reviewed. No pertinent family history.    Medical History Reviewed by provider this encounter:  Tobacco  Allergies  Meds  Problems  Med Hx  Surg Hx  Fam Hx          Objective   There were no vitals taken for this visit.     Mental Status Evaluation:    Appearance age appropriate, casually dressed   Behavior pleasant, cooperative, calm   Speech normal rate, normal volume, normal pitch, spontaneous   Mood anxious   Affect mood-congruent   Thought Processes organized, goal directed  "  Thought Content no overt delusions   Perceptual Disturbances: no auditory hallucinations, no visual hallucinations   Abnormal Thoughts  Risk Potential Suicidal ideation - None at present  Homicidal ideation - None at present  Potential for aggression - No   Orientation oriented to person, place, time/date, and situation   Memory recent and remote memory grossly intact   Consciousness alert and awake   Attention Span Concentration Span attention span and concentration are age appropriate   Intellect appears to be of average intelligence   Insight intact   Judgement intact   Muscle Strength and  Gait unable to assess today due to virtual visit   Motor activity no abnormal movements   Language no difficulty naming common objects, no difficulty repeating a phrase   Fund of Knowledge adequate knowledge of current events  adequate fund of knowledge regarding past history  adequate fund of knowledge regarding vocabulary    Pain none   Pain Scale 0       Laboratory Results: I have personally reviewed all pertinent laboratory/tests results    Lipid Profile: No results found for: \"CHOLESTEROL\", \"HDL\", \"TRIG\", \"LDLCALC\", \"NONHDLC\"  Hemoglobin A1C: No results found for: \"HGBA1C\", \"EAG\"    Suicide/Homicide Risk Assessment:    Risk of Harm to Self:  Based on today's assessment, Deandre presents the following risk of harm to self: none    Risk of Harm to Others:  Based on today's assessment, Deandre presents the following risk of harm to others: none    The following interventions are recommended: Continue medication management. No other intervention changes indicated at this time.    Psychotherapy Provided:     Individual psychotherapy provided: No    Treatment Plan:    Completed and signed during the session:  not completed    Goals: Progress towards Treatment Plan goals - Yes, progressing, as evidenced by subjective findings in HPI/Subjective Section        Note Share:    This note was not shared with the patient due to reasonable " likelihood of causing patient harm    Administrative Statements   Administrative Statements   Encounter provider Rebeca Castro PA-C    The Patient is located at Home and in the following state in which I hold an active license [place].    The patient was identified by name and date of birth. Deandre Jo was informed that this is a telemedicine visit and that the visit is being conducted through the Epic Embedded platform. He agrees to proceed..  My office door was closed. No one else was in the room.  He acknowledged consent and understanding of privacy and security of the video platform. The patient has agreed to participate and understands they can discontinue the visit at any time.    I have spent a total time of 23 minutes in caring for this patient on the day of the visit/encounter including Risks and benefits of tx options, Instructions for management, Patient and family education, and Importance of tx compliance, not including the time spent for establishing the audio/video connection.    Visit Time  Visit Start Time: 9am  Visit Stop Time: 921am  Total Visit Duration:  21 minutes        Rebeca Castro PA-C 03/31/25

## 2025-07-16 ENCOUNTER — TELEPHONE (OUTPATIENT)
Dept: PSYCHIATRY | Facility: CLINIC | Age: 45
End: 2025-07-16

## 2025-07-16 NOTE — TELEPHONE ENCOUNTER
Kira'd Fax [facility] PA Needed Paroxetine HCL ER 25 mg Tabs  (scanned in media)    [id number]  Name Deysi  FLORIAN 1980

## 2025-07-17 NOTE — TELEPHONE ENCOUNTER
PA for PARoxetine (PAXIL-CR) 25 mg 24 hr tablet   SUBMITTED to [facility]     via    []CMM-KEY:   [x]Surescripts-Case ID # [id number]   []Availity-Auth ID # NDC #   []Faxed to plan   []Other website   []Phone call Case ID #     []PA sent as URGENT    All office notes, labs and other pertaining documents and studies sent. Clinical questions answered. Awaiting determination from insurance company.     Turnaround time for your insurance to make a decision on your Prior Authorization can take 7-21 business days.

## 2025-07-23 ENCOUNTER — TELEPHONE (OUTPATIENT)
Dept: PSYCHIATRY | Facility: CLINIC | Age: 45
End: 2025-07-23

## 2025-07-23 NOTE — TELEPHONE ENCOUNTER
Duplicate encounter created, please see telephone encounter from 7/16/25 regarding paxil PA status. Please review patient's chart to see if there is already an encounter regarding the medication in question and to document anything regarding this medication in regards to anything regarding the authorization process etc before creating another encounter Thank You.

## 2025-07-23 NOTE — TELEPHONE ENCOUNTER
I would need clinical rationale as to why patient can not try and fail two formulary alternative. Insurance is aware patient has been on medication since 2020 and is stable

## 2025-07-23 NOTE — TELEPHONE ENCOUNTER
Called Deandre regarding PA denial.  Asked if he recalls what other medication trials he has had.  Deandre reports being on Zoloft, Risperidone and Paroxetine IR.  Zoloft was ineffective to the point he ended up out of work and Risperidone did not do what it was supposed to.  He states that is when he ended up on Latuda and Paxil ER.      Will refer to Rebeca Castro for review.

## 2025-07-23 NOTE — TELEPHONE ENCOUNTER
PA for paroxetine 25 mg  DENIED    Reason:(Screenshot if applicable)        Message sent to office clinical pool Yes    Denial letter scanned into Media Yes    We can gladly do an appeal but the process can take about 30-60 days to provide determination. Please have the office staff schedule a Peer to Peer at phone no   . If an appeal is truly warranted please have Provider send clinical documentation to the PA department to support the appeal.     **Please follow up with your patient regarding denial and next steps**

## 2025-07-23 NOTE — TELEPHONE ENCOUNTER
Forwarding to PA team for review.     Reason for call:   [x] Prior Auth  [] Other:     Caller:  [x] Patient  [] Pharmacy  Name: [facility] [address] [place] [address]      Callback Number: [phone]     Medication: PARoxetine (PAXIL-CR) 25 mg 24 hr tablet Take 2 tablets (50 mg total) by mouth every morning       Ordering Provider:   [] PCP/Provider -   [x] Speciality/Provider - psychiatry

## 2025-07-24 NOTE — TELEPHONE ENCOUNTER
PA for paroxetine 25 mg [facility]BM[facility]ED [facility]    via    []CMM-KEY:   [x]Surescripts-Case ID # [id number]   []Availity-Auth ID # NDC #   []Faxed to plan   []Other website   []Phone call Case ID #     [x]PA sent as URGENT    All office notes, labs and other pertaining documents and studies sent. Clinical questions answered. Awaiting determination from insurance company.     Turnaround time for your insurance to make a decision on your Prior Authorization can take 7-21 business days.

## 2025-07-28 ENCOUNTER — TELEPHONE (OUTPATIENT)
Dept: PSYCHIATRY | Facility: CLINIC | Age: 45
End: 2025-07-28

## 2025-07-29 DIAGNOSIS — F42.2 MIXED OBSESSIONAL THOUGHTS AND ACTS: Primary | ICD-10-CM

## 2025-07-29 RX ORDER — PAROXETINE 40 MG/1
40 TABLET, FILM COATED ORAL EVERY MORNING
Qty: 30 TABLET | Refills: 0 | Status: SHIPPED | OUTPATIENT
Start: 2025-07-29 | End: 2025-08-28